# Patient Record
Sex: FEMALE | Race: WHITE | NOT HISPANIC OR LATINO | Employment: OTHER | URBAN - METROPOLITAN AREA
[De-identification: names, ages, dates, MRNs, and addresses within clinical notes are randomized per-mention and may not be internally consistent; named-entity substitution may affect disease eponyms.]

---

## 2017-03-30 ENCOUNTER — HISTORICAL (OUTPATIENT)
Dept: ADMINISTRATIVE | Facility: HOSPITAL | Age: 82
End: 2017-03-30

## 2017-06-14 ENCOUNTER — HISTORICAL (OUTPATIENT)
Dept: LAB | Facility: HOSPITAL | Age: 82
End: 2017-06-14

## 2017-09-27 ENCOUNTER — HISTORICAL (OUTPATIENT)
Dept: LAB | Facility: HOSPITAL | Age: 82
End: 2017-09-27

## 2018-01-03 ENCOUNTER — HISTORICAL (OUTPATIENT)
Dept: RADIOLOGY | Facility: HOSPITAL | Age: 83
End: 2018-01-03

## 2018-04-04 ENCOUNTER — HISTORICAL (OUTPATIENT)
Dept: ADMINISTRATIVE | Facility: HOSPITAL | Age: 83
End: 2018-04-04

## 2018-05-11 ENCOUNTER — HISTORICAL (OUTPATIENT)
Dept: RADIOLOGY | Facility: HOSPITAL | Age: 83
End: 2018-05-11

## 2018-07-03 ENCOUNTER — HISTORICAL (OUTPATIENT)
Dept: LAB | Facility: HOSPITAL | Age: 83
End: 2018-07-03

## 2018-08-24 ENCOUNTER — HISTORICAL (OUTPATIENT)
Dept: CARDIOLOGY | Facility: HOSPITAL | Age: 83
End: 2018-08-24

## 2018-10-03 ENCOUNTER — HISTORICAL (OUTPATIENT)
Dept: LAB | Facility: HOSPITAL | Age: 83
End: 2018-10-03

## 2018-10-18 ENCOUNTER — HISTORICAL (OUTPATIENT)
Dept: RADIOLOGY | Facility: HOSPITAL | Age: 83
End: 2018-10-18

## 2018-10-30 ENCOUNTER — HISTORICAL (OUTPATIENT)
Dept: LAB | Facility: HOSPITAL | Age: 83
End: 2018-10-30

## 2018-11-06 ENCOUNTER — HISTORICAL (OUTPATIENT)
Dept: ADMINISTRATIVE | Facility: HOSPITAL | Age: 83
End: 2018-11-06

## 2018-11-12 ENCOUNTER — HISTORICAL (OUTPATIENT)
Dept: ADMINISTRATIVE | Facility: HOSPITAL | Age: 83
End: 2018-11-12

## 2019-01-02 ENCOUNTER — HISTORICAL (OUTPATIENT)
Dept: RADIOLOGY | Facility: HOSPITAL | Age: 84
End: 2019-01-02

## 2019-01-07 ENCOUNTER — HISTORICAL (OUTPATIENT)
Dept: ENDOSCOPY | Facility: HOSPITAL | Age: 84
End: 2019-01-07

## 2019-01-14 LAB
BUN SERPL-MCNC: 18 MG/DL (ref 7–18)
CALCIUM SERPL-MCNC: 9.7 MG/DL (ref 8.5–10.1)
CHLORIDE SERPL-SCNC: 95 MMOL/L (ref 98–107)
CO2 SERPL-SCNC: 29 MMOL/L (ref 21–32)
CREAT SERPL-MCNC: 1.3 MG/DL (ref 0.6–1.3)
GLUCOSE SERPL-MCNC: 129 MG/DL (ref 74–106)
POTASSIUM SERPL-SCNC: 4.6 MMOL/L (ref 3.5–5.1)
SODIUM SERPL-SCNC: 136 MEQ/L (ref 131–145)

## 2019-03-21 ENCOUNTER — HISTORICAL (OUTPATIENT)
Dept: ADMINISTRATIVE | Facility: HOSPITAL | Age: 84
End: 2019-03-21

## 2019-03-21 LAB
ABS NEUT (OLG): 2.97 X10(3)/MCL (ref 2.1–9.2)
ALBUMIN SERPL-MCNC: 3.2 GM/DL (ref 3.4–5)
ALBUMIN/GLOB SERPL: 0.8 {RATIO}
ALP SERPL-CCNC: 73 UNIT/L (ref 38–126)
ALT SERPL-CCNC: 21 UNIT/L (ref 12–78)
APPEARANCE, UA: NORMAL
AST SERPL-CCNC: 26 UNIT/L (ref 15–37)
BACTERIA SPEC CULT: NORMAL /HPF
BASOPHILS # BLD AUTO: 0 X10(3)/MCL (ref 0–0.2)
BASOPHILS NFR BLD AUTO: 1 %
BILIRUB SERPL-MCNC: 0.4 MG/DL (ref 0.2–1)
BILIRUB UR QL STRIP: NEGATIVE
BILIRUBIN DIRECT+TOT PNL SERPL-MCNC: 0.1 MG/DL (ref 0–0.2)
BILIRUBIN DIRECT+TOT PNL SERPL-MCNC: 0.3 MG/DL (ref 0–0.8)
BUN SERPL-MCNC: 17 MG/DL (ref 7–18)
CALCIUM SERPL-MCNC: 9.4 MG/DL (ref 8.5–10.1)
CHLORIDE SERPL-SCNC: 104 MMOL/L (ref 98–107)
CO2 SERPL-SCNC: 31 MMOL/L (ref 21–32)
COLOR UR: NORMAL
CREAT SERPL-MCNC: 0.79 MG/DL (ref 0.55–1.02)
DEPRECATED CALCIDIOL+CALCIFEROL SERPL-MC: 53.51 NG/ML (ref 30–80)
EOSINOPHIL # BLD AUTO: 0.2 X10(3)/MCL (ref 0–0.9)
EOSINOPHIL NFR BLD AUTO: 4 %
ERYTHROCYTE [DISTWIDTH] IN BLOOD BY AUTOMATED COUNT: 13.1 % (ref 11.5–17)
EST. AVERAGE GLUCOSE BLD GHB EST-MCNC: 123 MG/DL
GLOBULIN SER-MCNC: 4 GM/DL (ref 2.4–3.5)
GLUCOSE (UA): NEGATIVE
GLUCOSE SERPL-MCNC: 87 MG/DL (ref 74–106)
HBA1C MFR BLD: 5.9 % (ref 4.2–6.3)
HCT VFR BLD AUTO: 39 % (ref 37–47)
HGB BLD-MCNC: 12.3 GM/DL (ref 12–16)
HGB UR QL STRIP: NEGATIVE
KETONES UR QL STRIP: NEGATIVE
LEUKOCYTE ESTERASE UR QL STRIP: NEGATIVE
LYMPHOCYTES # BLD AUTO: 1.5 X10(3)/MCL (ref 0.6–4.6)
LYMPHOCYTES NFR BLD AUTO: 29 %
MCH RBC QN AUTO: 31.5 PG (ref 27–31)
MCHC RBC AUTO-ENTMCNC: 31.5 GM/DL (ref 33–36)
MCV RBC AUTO: 100 FL (ref 80–94)
MONOCYTES # BLD AUTO: 0.6 X10(3)/MCL (ref 0.1–1.3)
MONOCYTES NFR BLD AUTO: 11 %
NEUTROPHILS # BLD AUTO: 2.97 X10(3)/MCL (ref 2.1–9.2)
NEUTROPHILS NFR BLD AUTO: 56 %
NITRITE UR QL STRIP: NEGATIVE
PH UR STRIP: 7 [PH] (ref 5–9)
PLATELET # BLD AUTO: 216 X10(3)/MCL (ref 130–400)
PMV BLD AUTO: 9.6 FL (ref 9.4–12.4)
POTASSIUM SERPL-SCNC: 4.7 MMOL/L (ref 3.5–5.1)
PROT SERPL-MCNC: 7.2 GM/DL (ref 6.4–8.2)
PROT UR QL STRIP: NEGATIVE
RBC # BLD AUTO: 3.9 X10(6)/MCL (ref 4.2–5.4)
RBC #/AREA URNS HPF: NORMAL /[HPF]
SODIUM SERPL-SCNC: 140 MMOL/L (ref 136–145)
SP GR UR STRIP: 1.02 (ref 1–1.03)
SQUAMOUS EPITHELIAL, UA: NORMAL
TSH SERPL-ACNC: 3.77 MIU/L (ref 0.36–3.74)
UROBILINOGEN UR STRIP-ACNC: 0.2
WBC # SPEC AUTO: 5.3 X10(3)/MCL (ref 4.5–11.5)
WBC #/AREA URNS HPF: NORMAL /[HPF]

## 2019-09-24 ENCOUNTER — HISTORICAL (OUTPATIENT)
Dept: ADMINISTRATIVE | Facility: HOSPITAL | Age: 84
End: 2019-09-24

## 2020-03-09 ENCOUNTER — HISTORICAL (OUTPATIENT)
Dept: ADMINISTRATIVE | Facility: HOSPITAL | Age: 85
End: 2020-03-09

## 2020-07-22 ENCOUNTER — HISTORICAL (OUTPATIENT)
Dept: RADIOLOGY | Facility: HOSPITAL | Age: 85
End: 2020-07-22

## 2021-07-16 ENCOUNTER — HISTORICAL (OUTPATIENT)
Dept: CARDIOLOGY | Facility: HOSPITAL | Age: 86
End: 2021-07-16

## 2021-09-27 ENCOUNTER — HISTORICAL (OUTPATIENT)
Dept: ADMINISTRATIVE | Facility: HOSPITAL | Age: 86
End: 2021-09-27

## 2022-04-07 ENCOUNTER — HISTORICAL (OUTPATIENT)
Dept: ADMINISTRATIVE | Facility: HOSPITAL | Age: 87
End: 2022-04-07

## 2022-04-23 VITALS
BODY MASS INDEX: 18.82 KG/M2 | DIASTOLIC BLOOD PRESSURE: 77 MMHG | WEIGHT: 119.94 LBS | HEIGHT: 67 IN | OXYGEN SATURATION: 98 % | SYSTOLIC BLOOD PRESSURE: 134 MMHG

## 2022-04-30 NOTE — OP NOTE
Patient:   Mary Pedraza            MRN: [id number]            FIN: [id number]               Age:   85 years     Sex:  Female     :  1935   Associated Diagnoses:   None   Author:   You Joya MD      DATE OF SURGERY:    21    SURGEON:  You Joya MD    PREOPERATIVE DIAGNOSIS:  T2, T3 compression fractures.    POSTOPERATIVE DIAGNOSIS:  T2, T3 compression fractures.    PROCEDURES PERFORMED:    1. T2, T3 kyphoplasty.  2. Bone biopsy at T2 and T3.    EQUIPMENT USED:  CyberDefender kyphoplasty tray.    DESCRIPTION OF PROCEDURE:  Following informed consent, and with the patient under general endotracheal anesthesia, they were prepped and draped in the usual sterile fashion.  The T2 vertebral body was located under fluoroscopy.  An incision was made over the left pedicle.  A trocar was advanced via a left extrapedicular approach to the posterior third of the vertebral body.  Bone biopsy was performed.  A drill was inserted and advanced to the anterior third of the vertebral body.  This was removed.  A balloon was then inserted and inflated to a maximum pressure of 300 psi.  This was deflated and removed.  The T3 vertebral body was located under fluoroscopy.  An incision was made over the left pedicle.  A trocar was advanced via a left extrapedicular approach to the posterior third of the vertebral body.  Bone biopsy was performed.  A drill was inserted and advanced to the anterior third of the vertebral body.  This was removed.  A balloon was then inserted and inflated to a maximum pressure of 200 psi.  This was deflated and removed.  Bone cement was prepared and 1.5 mL were placed at T3 and 1cc at T2.  The cannulae was carefully removed.  Final fluoroscopic AP and lateral views demonstrated good cement filling.  The patient tolerated the procedure well without apparent complication.      ______________________________  You Joya MD

## 2022-04-30 NOTE — H&P
HISTORY OF PRESENT ILLNESS:  83-year-old white female with a personal history of colon polyps, and a history of ulcerative proctitis and lymphocytic colitis, presents now for surveillance colonoscopy.  She has a family history of colon cancer in multiple first-degree relatives.  Her last colonoscopy was in November 2013, where she was found to have small adenomatous polyps.  A 5-year followup was recommended.  The patient was seen in October of last year for evaluation of right upper quadrant abdominal pain.  This led to a CT scan of her abdomen that revealed a 2.5 cm mass in the upper pole of her right kidney, suspicious for renal cell carcinoma.  She is to follow up with Urology and apparently surgery will be scheduled at her next followup visit with Dr. Yeboah.  She presents now for colonoscopy.  She denies any melena, hematochezia or change in bowel functions.    ALLERGIES:  Oxycodone.    MEDICATIONS:    1. Amitriptyline.   2. Asacol.   3. Baby aspirin.   4. B complex.  5. Biotin.    6. Caltrate.  7. Ocuvite.   8. Vitamin D3.    PAST MEDICAL HISTORY:  History of breast cancer, history of colon polyps, hypertension, osteopenia.    PAST SURGICAL HISTORY:  Appendectomy, vaginal surgery, cataract surgery, bilateral salpingo-oophorectomy, laparoscopic cholecystectomy, right breast mastectomy.    SOCIAL HISTORY:  She is .  She is retired.  No smoking.  No alcohol.    FAMILY HISTORY:  She had a brother and father die from colon cancer.    PHYSICAL EXAMINATION:  VITAL SIGNS:  Stable.  Afebrile.   GENERAL:  She is an older female in no acute distress.   HEENT:  Sclerae nonicteric.  Conjunctivae pink.     NECK:  No adenopathy or thyromegaly.   CARDIOVASCULAR:  Regular rate and rhythm.   LUNGS:  Clear.   ABDOMEN:  Soft, nontender.  Bowel sounds normal.  No mass or organomegaly appreciated.   EXTREMITIES:  No clubbing, cyanosis or edema.     NEUROLOGIC:  Alert and oriented.  No focal deficits.    IMPRESSION:   Personal history of colon polyps, and family history of colon cancer.    RECOMMENDATIONS:  We will proceed with surveillance colonoscopy.        ______________________________  Jr Espino MD    [facility]  DD:  01/07/2019  Time:  09:29AM  DT:  01/07/2019  Time:  10:40AM  Job #:  [id number]    The H&P was reviewed, the patient was examined, and the following changes to the patients condition are noted:  ______________________________________________________________________________  ______________________________________________________________________________  ______________________________________________________________________________  [  ] No changes to the patient's condition:      ______________________________                                             ___________________  PHYSICIAN SIGNATURE                                                             DATE/TIME    cc: MD Sloan Gordon MD

## 2022-09-15 ENCOUNTER — HISTORICAL (OUTPATIENT)
Dept: ADMINISTRATIVE | Facility: HOSPITAL | Age: 87
End: 2022-09-15

## 2022-09-19 ENCOUNTER — TELEPHONE (OUTPATIENT)
Dept: INTERNAL MEDICINE | Facility: CLINIC | Age: 87
End: 2022-09-19
Payer: MEDICARE

## 2022-09-19 DIAGNOSIS — Z00.00 WELLNESS EXAMINATION: Primary | ICD-10-CM

## 2022-09-19 NOTE — TELEPHONE ENCOUNTER
----- Message from Zenobia Lynch sent at 9/19/2022  9:54 AM CDT -----  Pt's appt 9.29.22, Lab orders need to be put in please, pt's appt for Labs are schd for 9.23.22

## 2022-09-22 ENCOUNTER — TELEPHONE (OUTPATIENT)
Dept: ADMINISTRATIVE | Facility: HOSPITAL | Age: 87
End: 2022-09-22
Payer: MEDICARE

## 2022-09-22 NOTE — TELEPHONE ENCOUNTER
----- Message from Juan Vargas MA sent at 9/22/2022 10:52 AM CDT -----  Regarding: PV 9/29/22 @ 10:40 Dr. Macias  1. Are there any outstanding tasks in the patient's chart? Yes, fasting labs    2. Is there any documentation in the chart? No    3.Has patient been seen in an ER, Urgent care clinic, or been admitted since last visit?  If yes, When, where, and why    4. Has patient seen any other healthcare providers since last visit?  If yes, when, where, and why    5. Has patient had any bloodwork or XR done since last visit?    6. Is patient signed up for patient portal?

## 2022-09-23 ENCOUNTER — LAB VISIT (OUTPATIENT)
Dept: LAB | Facility: HOSPITAL | Age: 87
End: 2022-09-23
Attending: INTERNAL MEDICINE
Payer: MEDICARE

## 2022-09-23 DIAGNOSIS — Z00.00 WELLNESS EXAMINATION: ICD-10-CM

## 2022-09-23 LAB
ALBUMIN SERPL-MCNC: 3.9 GM/DL (ref 3.4–4.8)
ALBUMIN/GLOB SERPL: 1.1 RATIO (ref 1.1–2)
ALP SERPL-CCNC: 70 UNIT/L (ref 40–150)
ALT SERPL-CCNC: 10 UNIT/L (ref 0–55)
APPEARANCE UR: ABNORMAL
AST SERPL-CCNC: 20 UNIT/L (ref 5–34)
BACTERIA #/AREA URNS AUTO: NORMAL /HPF
BASOPHILS # BLD AUTO: 0.04 X10(3)/MCL (ref 0–0.2)
BASOPHILS NFR BLD AUTO: 0.8 %
BILIRUB UR QL STRIP.AUTO: NEGATIVE MG/DL
BILIRUBIN DIRECT+TOT PNL SERPL-MCNC: 0.7 MG/DL
BUN SERPL-MCNC: 20.4 MG/DL (ref 9.8–20.1)
CALCIUM SERPL-MCNC: 10 MG/DL (ref 8.4–10.2)
CHLORIDE SERPL-SCNC: 100 MMOL/L (ref 98–107)
CHOLEST SERPL-MCNC: 228 MG/DL
CHOLEST/HDLC SERPL: 3 {RATIO} (ref 0–5)
CO2 SERPL-SCNC: 29 MMOL/L (ref 23–31)
COLOR UR AUTO: YELLOW
CREAT SERPL-MCNC: 0.84 MG/DL (ref 0.55–1.02)
DEPRECATED CALCIDIOL+CALCIFEROL SERPL-MC: 48.2 NG/ML (ref 30–80)
EOSINOPHIL # BLD AUTO: 0.16 X10(3)/MCL (ref 0–0.9)
EOSINOPHIL NFR BLD AUTO: 3.1 %
ERYTHROCYTE [DISTWIDTH] IN BLOOD BY AUTOMATED COUNT: 12.8 % (ref 11.5–17)
EST. AVERAGE GLUCOSE BLD GHB EST-MCNC: 114 MG/DL
GFR SERPLBLD CREATININE-BSD FMLA CKD-EPI: >60 MLS/MIN/1.73/M2
GLOBULIN SER-MCNC: 3.5 GM/DL (ref 2.4–3.5)
GLUCOSE SERPL-MCNC: 111 MG/DL (ref 82–115)
GLUCOSE UR QL STRIP.AUTO: NEGATIVE MG/DL
HBA1C MFR BLD: 5.6 %
HCT VFR BLD AUTO: 43.5 % (ref 37–47)
HDLC SERPL-MCNC: 77 MG/DL (ref 35–60)
HGB BLD-MCNC: 14.1 GM/DL (ref 12–16)
IMM GRANULOCYTES # BLD AUTO: 0.01 X10(3)/MCL (ref 0–0.04)
IMM GRANULOCYTES NFR BLD AUTO: 0.2 %
KETONES UR QL STRIP.AUTO: NEGATIVE MG/DL
LDLC SERPL CALC-MCNC: 136 MG/DL (ref 50–140)
LEUKOCYTE ESTERASE UR QL STRIP.AUTO: ABNORMAL UNIT/L
LYMPHOCYTES # BLD AUTO: 1.53 X10(3)/MCL (ref 0.6–4.6)
LYMPHOCYTES NFR BLD AUTO: 30.1 %
MCH RBC QN AUTO: 32 PG (ref 27–31)
MCHC RBC AUTO-ENTMCNC: 32.4 MG/DL (ref 33–36)
MCV RBC AUTO: 98.9 FL (ref 80–94)
MONOCYTES # BLD AUTO: 0.47 X10(3)/MCL (ref 0.1–1.3)
MONOCYTES NFR BLD AUTO: 9.3 %
NEUTROPHILS # BLD AUTO: 2.9 X10(3)/MCL (ref 2.1–9.2)
NEUTROPHILS NFR BLD AUTO: 56.5 %
NITRITE UR QL STRIP.AUTO: NEGATIVE
NRBC BLD AUTO-RTO: 0 %
PH UR STRIP.AUTO: 8 [PH]
PLATELET # BLD AUTO: 186 X10(3)/MCL (ref 130–400)
PMV BLD AUTO: 9.7 FL (ref 7.4–10.4)
POTASSIUM SERPL-SCNC: 4.4 MMOL/L (ref 3.5–5.1)
PROT SERPL-MCNC: 7.4 GM/DL (ref 5.8–7.6)
PROT UR QL STRIP.AUTO: ABNORMAL MG/DL
RBC # BLD AUTO: 4.4 X10(6)/MCL (ref 4.2–5.4)
RBC #/AREA URNS AUTO: <5 /HPF
RBC UR QL AUTO: NEGATIVE UNIT/L
SODIUM SERPL-SCNC: 136 MMOL/L (ref 136–145)
SP GR UR STRIP.AUTO: 1.01 (ref 1–1.03)
SQUAMOUS #/AREA URNS AUTO: <5 /HPF
TRIGL SERPL-MCNC: 75 MG/DL (ref 37–140)
TSH SERPL-ACNC: 2.53 UIU/ML (ref 0.35–4.94)
UROBILINOGEN UR STRIP-ACNC: 1 MG/DL
VLDLC SERPL CALC-MCNC: 15 MG/DL
WBC # SPEC AUTO: 5.1 X10(3)/MCL (ref 4.5–11.5)
WBC #/AREA URNS AUTO: <5 /HPF

## 2022-09-23 PROCEDURE — 84443 ASSAY THYROID STIM HORMONE: CPT

## 2022-09-23 PROCEDURE — 80053 COMPREHEN METABOLIC PANEL: CPT

## 2022-09-23 PROCEDURE — 82306 VITAMIN D 25 HYDROXY: CPT

## 2022-09-23 PROCEDURE — 85025 COMPLETE CBC W/AUTO DIFF WBC: CPT

## 2022-09-23 PROCEDURE — 83036 HEMOGLOBIN GLYCOSYLATED A1C: CPT

## 2022-09-23 PROCEDURE — 81001 URINALYSIS AUTO W/SCOPE: CPT

## 2022-09-23 PROCEDURE — 80061 LIPID PANEL: CPT

## 2022-09-23 PROCEDURE — 36415 COLL VENOUS BLD VENIPUNCTURE: CPT

## 2022-09-29 ENCOUNTER — OFFICE VISIT (OUTPATIENT)
Dept: INTERNAL MEDICINE | Facility: CLINIC | Age: 87
End: 2022-09-29
Payer: MEDICARE

## 2022-09-29 VITALS
HEIGHT: 67 IN | BODY MASS INDEX: 18.1 KG/M2 | OXYGEN SATURATION: 99 % | SYSTOLIC BLOOD PRESSURE: 138 MMHG | TEMPERATURE: 97 F | WEIGHT: 115.31 LBS | DIASTOLIC BLOOD PRESSURE: 80 MMHG | RESPIRATION RATE: 16 BRPM | HEART RATE: 69 BPM

## 2022-09-29 DIAGNOSIS — K59.00 CONSTIPATION, UNSPECIFIED CONSTIPATION TYPE: ICD-10-CM

## 2022-09-29 DIAGNOSIS — Z00.00 MEDICARE ANNUAL WELLNESS VISIT, SUBSEQUENT: ICD-10-CM

## 2022-09-29 DIAGNOSIS — I10 HYPERTENSION, UNSPECIFIED TYPE: Primary | ICD-10-CM

## 2022-09-29 DIAGNOSIS — E55.9 VITAMIN D DEFICIENCY: ICD-10-CM

## 2022-09-29 DIAGNOSIS — C64.9 MALIGNANT NEOPLASM OF KIDNEY, UNSPECIFIED LATERALITY: ICD-10-CM

## 2022-09-29 DIAGNOSIS — Z23 FLU VACCINE NEED: ICD-10-CM

## 2022-09-29 DIAGNOSIS — I35.0 AORTIC VALVE STENOSIS, ETIOLOGY OF CARDIAC VALVE DISEASE UNSPECIFIED: ICD-10-CM

## 2022-09-29 PROBLEM — C50.919 MALIGNANT NEOPLASM OF BREAST: Status: ACTIVE | Noted: 2022-09-29

## 2022-09-29 PROBLEM — K52.9 COLITIS: Status: ACTIVE | Noted: 2022-09-29

## 2022-09-29 PROBLEM — N28.89 RENAL MASS: Status: ACTIVE | Noted: 2022-09-29

## 2022-09-29 PROCEDURE — 90694 FLU VACCINE - QUADRIVALENT - ADJUVANTED: ICD-10-PCS | Mod: ,,, | Performed by: INTERNAL MEDICINE

## 2022-09-29 PROCEDURE — G0439 PPPS, SUBSEQ VISIT: HCPCS | Mod: ,,, | Performed by: INTERNAL MEDICINE

## 2022-09-29 PROCEDURE — G0008 FLU VACCINE - QUADRIVALENT - ADJUVANTED: ICD-10-PCS | Mod: ,,, | Performed by: INTERNAL MEDICINE

## 2022-09-29 PROCEDURE — 90694 VACC AIIV4 NO PRSRV 0.5ML IM: CPT | Mod: ,,, | Performed by: INTERNAL MEDICINE

## 2022-09-29 PROCEDURE — G0439 PR MEDICARE ANNUAL WELLNESS SUBSEQUENT VISIT: ICD-10-PCS | Mod: ,,, | Performed by: INTERNAL MEDICINE

## 2022-09-29 PROCEDURE — G0008 ADMIN INFLUENZA VIRUS VAC: HCPCS | Mod: ,,, | Performed by: INTERNAL MEDICINE

## 2022-09-29 RX ORDER — LUBIPROSTONE 8 UG/1
8 CAPSULE ORAL 2 TIMES DAILY PRN
Qty: 60 CAPSULE | Refills: 0 | Status: SHIPPED | OUTPATIENT
Start: 2022-09-29 | End: 2023-03-02 | Stop reason: SDUPTHER

## 2022-09-29 RX ORDER — AMITRIPTYLINE HYDROCHLORIDE 10 MG/1
10 TABLET, FILM COATED ORAL NIGHTLY
COMMUNITY
Start: 2022-09-22 | End: 2023-01-11 | Stop reason: SDUPTHER

## 2022-09-29 RX ORDER — METOPROLOL SUCCINATE 25 MG/1
25 TABLET, EXTENDED RELEASE ORAL DAILY
COMMUNITY
Start: 2022-09-19

## 2022-09-29 RX ORDER — PANTOPRAZOLE SODIUM 40 MG/1
40 TABLET, DELAYED RELEASE ORAL EVERY MORNING
COMMUNITY
Start: 2022-09-19

## 2022-09-29 NOTE — PROGRESS NOTES
Patient ID: [id number]     Chief Complaint: Medicare AWV      HPI:     Mary Pedraza is a 87 y.o. female here today for a Medicare Wellness.   86-year-old  female here for 6 month revisit  Past medical history of breast cancer in 1999. Right mastectomy.  Last left mammogram in January 2019.  Patient no longer wants to do mammograms.  Had 9 siblings at one time; now only 2 are living  Dr. Hernandez-cardiology.  Abnormal PET in July 2018, coronary angiogram done at that time which showed clean coronaries.  Aortic stenosis; moderate/ severe. Has some Dyspnea, no syncope, has some pre- syncope  Dr. 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  Last colonoscopy in January 2019. Off mesalamine  Dr. Yeboah-urology history of renal cell carcinoma status post partial nephrectomy. Goes to see him once a year with scans.   Patient with history of osteopenia, no longer wants bone densities  Had kyphoplasty recently  Trulance PRN for constipation  Flu shot  Bivalent COVID  Has lost 4 lbs since last visit; she use to weigh 119  She reports a good appetite  She reports being under stress  Discussed depression and meds for depression and she declines    Opioid Screening: Patient medication list reviewed, patient is not taking prescription opioids. Patient is not using additional opioids than prescribed. Patient is not at low risk of substance abuse based on this opioid use history.       ----------------------------  Anemia, unspecified  Anxiety disorder, unspecified  Bilateral cataracts  Breast cancer  Bruises easily  Gastro-esophageal reflux disease without esophagitis  Generalized arthritis  HTN (hypertension)  Kidney mass  Other specified noninfective gastroenteritis and colitis  Panic attacks  Personal history of colonic polyps  Renal cancer     History reviewed. No pertinent surgical history.    Review of patient's allergies indicates:   Allergen Reactions    Adhesive tape-silicones Hives     Neomycin-bacitracin-polymyxin      Other reaction(s): makes area worse    Oxycodone      Other reaction(s): hoarseness  restless, rash    Dexamethasone Rash     pain in jaw, shoulder, ears, and rash       Outpatient Medications Marked as Taking for the 9/29/22 encounter (Office Visit) with Sloan Rai MD   Medication Sig Dispense Refill    amitriptyline (ELAVIL) 10 MG tablet Take 10 mg by mouth every evening.      metoprolol succinate (TOPROL-XL) 25 MG 24 hr tablet Take 25 mg by mouth once daily.      pantoprazole (PROTONIX) 40 MG tablet Take 40 mg by mouth every morning.         Social History     Socioeconomic History    Marital status:    Tobacco Use    Smoking status: Never    Smokeless tobacco: Never   Substance and Sexual Activity    Alcohol use: Not Currently    Drug use: Never    Sexual activity: Not Currently        History reviewed. No pertinent family history.     Patient Care Team:  Sloan Rai MD as PCP - General (Internal Medicine)  Sloan Rai MD       Subjective:     ROS  Constitutional: No fever, no chills, no night sweats, + changes in weight, no changes in appetite.  Eye: No blurring of vision, no double vision, no conjunctival injection, no acute vision loss  ENMT: No trauma, No sore throat, no rhinorrhea, no tinnitus, no headache, no vertigo, no ear pain or discharge  Respiratory: No cough, no sputum production, no shortness of breath, no hemoptysis, no wheezing.  Cardiovascular: No chest pain, no DILLON, no PND, no orthopnea, no edema, no palpitations.  Gastrointestinal: No abdominal pain, nausea, no vomiting, no changes in frequency or consistency of stools, no diarrhea, no constipation, no BRBPR.  Genitourinary: No dysuria, no hematuria, no foul-smelling urine, no straining to urinate, no increase in frequency  Heme/Lymph: No easy bruising/ bleeding, no swollen or painful glands.  Endocrine: No polyuria, no polydipsia, no polyphagia, no heat or cold  "intolerance.  Musculoskeletal: No muscle pain, no muscle weakness, no joint pain, no difficulties on reference to range of motion.  Integumentary: No rash, no pruritis.  Neurologic: No sensory deficit, no motor deficit, no headache, no neck rigidity, no paresthesias, no syncope.  Psychiatric: no mood changes, no anxiety, no depression, no tension, no memory defecits  All Other ROS: Negative with exception of what is documented in the history of present illness     Patient Reported Health Risk Assessment       Objective:     /80 (BP Location: Left arm, Patient Position: Sitting, BP Method: Medium (Manual))   Pulse 69   Temp 97.2 °F (36.2 °C) (Temporal)   Resp 16   Ht 5' 7" (1.702 m)   Wt 52.3 kg (115 lb 4.8 oz)   SpO2 99%   BMI 18.06 kg/m²     Physical Exam  General : Alert and oriented, No acute distress, afebrile.  Eye : PERRLA. EOMI. Normal conjunctiva, Sclerae are nonicteric. No conjunctival injection, no pallor.  HEENT : Normocephalic/ atraumatic, Normal hearing, Oral mucosa is moist.  Respiratory : Respirations are non-labored and clear to auscultation bilaterally. Symmetrical air entry bilaterally, no crackles, no wheezes, no rhonchi. No cyanosis, no clubbing.  Cardiovascular : Normal rate, Regular rhythm. No murmurs, rubs, or gallops. Pulses are 2+ throughout. No JVD. No Edema.  Gastrointestinal : Soft, nontender, non-distended, bowel sounds are present in all quadrants, no organomegaly, no guarding, no rebound.  Musculoskeletal : Normal range of motion throughout. No muscle tenderness.  Integumentary : Warm, moist, intact.  Neurologic : Alert, Oriented  Psychiatric : Cooperative, Appropriate mood & affect.     No flowsheet data found.  Fall Risk Assessment - Outpatient 9/29/2022   Mobility Status Ambulatory   Number of falls 0   Identified as fall risk 0              Assessment/Plan:     1. Hypertension, unspecified type  Overview:  no cardiologist, managed by Dr Smith    Orders:  -     " Urinalysis, Reflex to Urine Culture Urine, Clean Catch  -     Lipid Panel  -     Comprehensive Metabolic Panel  -     CBC Auto Differential  -     Vitamin D    2. Aortic valve stenosis, etiology of cardiac valve disease unspecified  Assessment & Plan:  Being monitored by Cardiology, patient asymptomatic.    Orders:  -     Urinalysis, Reflex to Urine Culture Urine, Clean Catch  -     Lipid Panel  -     Comprehensive Metabolic Panel  -     CBC Auto Differential  -     Vitamin D    3. Malignant neoplasm of kidney, unspecified laterality    4. Medicare annual wellness visit, subsequent  Assessment & Plan:  General health maintenance education given, labs reviewed excellent.  Flu vaccine today.  I advised on new COVID vaccine.  Age-appropriate exams are otherwise up-to-date.  RTC 6 months for revisit, sooner if needed      5. Constipation, unspecified constipation type    6. Vitamin D deficiency  -     Vitamin D    Other orders  -     lubiprostone (AMITIZA) 8 MCG Cap         Medicare Annual Wellness and Personalized Prevention Plan:   Fall Risk + Home Safety + Hearing Impairment + Depression Screen + Opioid and Substance Abuse Screening + Cognitive Impairment Screen + Health Risk Assessment all reviewed.     Health Maintenance Topics with due status: Not Due       Topic Last Completion Date    Lipid Panel 09/23/2022      The patient's Health Maintenance was reviewed and the following appears to be due at this time:   Health Maintenance Due   Topic Date Due    TETANUS VACCINE  Never done    Shingles Vaccine (1 of 2) Never done    COVID-19 Vaccine (5 - Booster for Moderna series) 08/30/2022    Influenza Vaccine (1) 09/01/2022       Advance Care Planning   I attest to discussing Advance Care Planning with patient and/or family member.  Education was provided including the importance of the Health Care Power of , Advance Directives, and/or [place] documentation.  The patient expressed understanding to the importance  of this information and discussion.         Follow up in about 6 months (around 3/29/2023) for with labs prior to visit, NURSE PRACTITIONER. In addition to their scheduled follow up, the patient has also been instructed to follow up on as needed basis.

## 2022-09-29 NOTE — ASSESSMENT & PLAN NOTE
General health maintenance education given, labs reviewed excellent.  Flu vaccine today.  I advised on new COVID vaccine.  Age-appropriate exams are otherwise up-to-date.  RTC 6 months for revisit, sooner if needed

## 2022-09-29 NOTE — PROGRESS NOTES
Subjective:      Patient ID: Mary Pedraza is a 87 y.o. female.    Chief Complaint: No chief complaint on file.      HPI:  86-year-old  female here for 6 month revisit  Past medical history of breast cancer in 1999. Right mastectomy.  Last left mammogram in January 2019.  Patient no longer wants to do mammograms.  Had 9 siblings at one time; now only 2 are living  Dr. Hernandez-cardiology.  Abnormal PET in July 2018, coronary angiogram done at that time which showed clean coronaries.  Aortic stenosis; moderate/ severe. Has some Dyspnea, no syncope, has some pre- syncope  Dr. 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  Last colonoscopy in January 2019. Off mesalamine  Dr. Yeboah-urology history of renal cell carcinoma status post partial nephrectomy. Sees [place] this month  Patient with history of osteopenia, no longer wants bone densities  Had kyphoplasty recently  She presents today with complaints of constipation she is doing Colace which is not really helpful  She has tried MiraLAX just once a day with no improvement; stools are hard    Past Medical History:  No past medical history on file.  No past surgical history on file.  Review of patient's allergies indicates:  Not on File  No current outpatient medications on file prior to visit.     No current facility-administered medications on file prior to visit.     Social History     Socioeconomic History    Marital status:      No family history on file.    Review of Systems  A comprehensive review of systems was performed and was negative with exception of what is documented above.     Objective:   There were no vitals taken for this visit.  Physical Exam    Assessment/ Plan:   {There are no diagnoses linked to this encounter. (Refresh or delete this SmartLink)}         No follow-ups on file.

## 2023-01-02 PROBLEM — Z00.00 MEDICARE ANNUAL WELLNESS VISIT, SUBSEQUENT: Status: RESOLVED | Noted: 2022-09-29 | Resolved: 2023-01-02

## 2023-01-11 ENCOUNTER — TELEPHONE (OUTPATIENT)
Dept: INTERNAL MEDICINE | Facility: CLINIC | Age: 88
End: 2023-01-11
Payer: MEDICARE

## 2023-01-11 DIAGNOSIS — C64.9 MALIGNANT NEOPLASM OF KIDNEY, UNSPECIFIED LATERALITY: Primary | ICD-10-CM

## 2023-01-11 RX ORDER — AMITRIPTYLINE HYDROCHLORIDE 10 MG/1
10 TABLET, FILM COATED ORAL NIGHTLY
Qty: 90 TABLET | Refills: 3 | Status: SHIPPED | OUTPATIENT
Start: 2023-01-11 | End: 2023-02-28

## 2023-01-11 NOTE — TELEPHONE ENCOUNTER
----- Message from Jackelyn Madison sent at 1/11/2023 12:51 PM CST -----  Amitriptyline 10 MG Tab  [facility]

## 2023-03-02 ENCOUNTER — TELEPHONE (OUTPATIENT)
Dept: INTERNAL MEDICINE | Facility: CLINIC | Age: 88
End: 2023-03-02
Payer: MEDICARE

## 2023-03-02 DIAGNOSIS — K59.00 CONSTIPATION, UNSPECIFIED CONSTIPATION TYPE: Primary | ICD-10-CM

## 2023-03-02 RX ORDER — LUBIPROSTONE 8 UG/1
8 CAPSULE ORAL 2 TIMES DAILY PRN
Qty: 180 CAPSULE | Refills: 1 | Status: SHIPPED | OUTPATIENT
Start: 2023-03-02 | End: 2024-01-22

## 2023-03-02 NOTE — TELEPHONE ENCOUNTER
----- Message from Blanca Montemayor sent at 3/2/2023 12:12 PM CST -----  .Type:  Needs Medical Advice    Who Called: pt  Symptoms (please be specific): stool is stuck   How long has patient had these symptoms:  today  Pharmacy name and phone #:  [id number] - [place] - [address] [facility] [address]  Would the patient rather a call back or a response via [facility]? Call back   Best Call Back Number: [phone]  Additional Information: pt states she has a fecal impaction  , she had a small BM this morning, please call

## 2023-03-02 NOTE — TELEPHONE ENCOUNTER
----- Message from Jackelyn Madison sent at 3/2/2023  3:51 PM CST -----  [facility] sent fax requesting for the Lubiprostone Cap.

## 2023-03-02 NOTE — TELEPHONE ENCOUNTER
Spoke with patient son . Pt is currently taking Trulance for constipation and its working. I advised pt son to call the office back if she start have any other issues.

## 2023-09-20 ENCOUNTER — TELEPHONE (OUTPATIENT)
Dept: INTERNAL MEDICINE | Facility: CLINIC | Age: 88
End: 2023-09-20
Payer: MEDICARE

## 2023-09-25 ENCOUNTER — LAB VISIT (OUTPATIENT)
Dept: LAB | Facility: HOSPITAL | Age: 88
End: 2023-09-25
Attending: INTERNAL MEDICINE
Payer: MEDICARE

## 2023-09-25 DIAGNOSIS — E55.9 VITAMIN D DEFICIENCY: ICD-10-CM

## 2023-09-25 DIAGNOSIS — I10 HYPERTENSION, UNSPECIFIED TYPE: ICD-10-CM

## 2023-09-25 DIAGNOSIS — I35.0 AORTIC VALVE STENOSIS, ETIOLOGY OF CARDIAC VALVE DISEASE UNSPECIFIED: ICD-10-CM

## 2023-09-25 LAB
ALBUMIN SERPL-MCNC: 3.7 G/DL (ref 3.4–4.8)
ALBUMIN/GLOB SERPL: 1.1 RATIO (ref 1.1–2)
ALP SERPL-CCNC: 62 UNIT/L (ref 40–150)
ALT SERPL-CCNC: 9 UNIT/L (ref 0–55)
APPEARANCE UR: CLEAR
AST SERPL-CCNC: 22 UNIT/L (ref 5–34)
BACTERIA #/AREA URNS AUTO: NORMAL /HPF
BASOPHILS # BLD AUTO: 0.04 X10(3)/MCL
BASOPHILS NFR BLD AUTO: 0.7 %
BILIRUB SERPL-MCNC: 0.8 MG/DL
BILIRUB UR QL STRIP.AUTO: NEGATIVE
BUN SERPL-MCNC: 18.9 MG/DL (ref 9.8–20.1)
CALCIUM SERPL-MCNC: 9.5 MG/DL (ref 8.4–10.2)
CHLORIDE SERPL-SCNC: 101 MMOL/L (ref 98–107)
CHOLEST SERPL-MCNC: 219 MG/DL
CHOLEST/HDLC SERPL: 3 {RATIO} (ref 0–5)
CO2 SERPL-SCNC: 25 MMOL/L (ref 23–31)
COLOR UR AUTO: YELLOW
CREAT SERPL-MCNC: 0.87 MG/DL (ref 0.55–1.02)
DEPRECATED CALCIDIOL+CALCIFEROL SERPL-MC: 51.3 NG/ML (ref 30–80)
EOSINOPHIL # BLD AUTO: 0.18 X10(3)/MCL (ref 0–0.9)
EOSINOPHIL NFR BLD AUTO: 3.1 %
ERYTHROCYTE [DISTWIDTH] IN BLOOD BY AUTOMATED COUNT: 13.1 % (ref 11.5–17)
GFR SERPLBLD CREATININE-BSD FMLA CKD-EPI: >60 MLS/MIN/1.73/M2
GLOBULIN SER-MCNC: 3.3 GM/DL (ref 2.4–3.5)
GLUCOSE SERPL-MCNC: 132 MG/DL (ref 82–115)
GLUCOSE UR QL STRIP.AUTO: NEGATIVE
HCT VFR BLD AUTO: 39.9 % (ref 37–47)
HDLC SERPL-MCNC: 73 MG/DL (ref 35–60)
HGB BLD-MCNC: 12.8 G/DL (ref 12–16)
IMM GRANULOCYTES # BLD AUTO: 0.02 X10(3)/MCL (ref 0–0.04)
IMM GRANULOCYTES NFR BLD AUTO: 0.3 %
KETONES UR QL STRIP.AUTO: NEGATIVE
LDLC SERPL CALC-MCNC: 132 MG/DL (ref 50–140)
LEUKOCYTE ESTERASE UR QL STRIP.AUTO: ABNORMAL
LYMPHOCYTES # BLD AUTO: 1.38 X10(3)/MCL (ref 0.6–4.6)
LYMPHOCYTES NFR BLD AUTO: 23.4 %
MCH RBC QN AUTO: 31.4 PG (ref 27–31)
MCHC RBC AUTO-ENTMCNC: 32.1 G/DL (ref 33–36)
MCV RBC AUTO: 97.8 FL (ref 80–94)
MONOCYTES # BLD AUTO: 0.42 X10(3)/MCL (ref 0.1–1.3)
MONOCYTES NFR BLD AUTO: 7.1 %
NEUTROPHILS # BLD AUTO: 3.85 X10(3)/MCL (ref 2.1–9.2)
NEUTROPHILS NFR BLD AUTO: 65.4 %
NITRITE UR QL STRIP.AUTO: NEGATIVE
NRBC BLD AUTO-RTO: 0 %
PH UR STRIP.AUTO: 7.5 [PH]
PLATELET # BLD AUTO: 176 X10(3)/MCL (ref 130–400)
PMV BLD AUTO: 9.6 FL (ref 7.4–10.4)
POTASSIUM SERPL-SCNC: 4.4 MMOL/L (ref 3.5–5.1)
PROT SERPL-MCNC: 7 GM/DL (ref 5.8–7.6)
PROT UR QL STRIP.AUTO: NEGATIVE
RBC # BLD AUTO: 4.08 X10(6)/MCL (ref 4.2–5.4)
RBC #/AREA URNS AUTO: NORMAL /HPF
RBC UR QL AUTO: NEGATIVE
SODIUM SERPL-SCNC: 135 MMOL/L (ref 136–145)
SP GR UR STRIP.AUTO: 1.01 (ref 1–1.03)
SQUAMOUS #/AREA URNS AUTO: <5 /HPF
TRIGL SERPL-MCNC: 71 MG/DL (ref 37–140)
UROBILINOGEN UR STRIP-ACNC: 0.2
VLDLC SERPL CALC-MCNC: 14 MG/DL
WBC # SPEC AUTO: 5.89 X10(3)/MCL (ref 4.5–11.5)
WBC #/AREA URNS AUTO: <5 /HPF

## 2023-09-25 PROCEDURE — 82306 VITAMIN D 25 HYDROXY: CPT

## 2023-09-25 PROCEDURE — 80061 LIPID PANEL: CPT

## 2023-09-25 PROCEDURE — 81001 URINALYSIS AUTO W/SCOPE: CPT

## 2023-09-25 PROCEDURE — 80053 COMPREHEN METABOLIC PANEL: CPT

## 2023-09-25 PROCEDURE — 36415 COLL VENOUS BLD VENIPUNCTURE: CPT

## 2023-09-25 PROCEDURE — 85025 COMPLETE CBC W/AUTO DIFF WBC: CPT

## 2023-10-17 NOTE — PROGRESS NOTES
Internal Medicine      Patient ID: [id number]     Chief Complaint: Medicare Annual Wellness     HPI:     Mary Pedraza is a 88 y.o. female here today for a Medicare Annual Wellness visit and comprehensive Health Risk Assessment. Reviewed and discussed lab results. Fasting glucose elevated but declines further evaluation today. 10# weight loss in the past couple of years.     A separate E/M code has been provided to evaluate additional complaints that the patient would like addressed during the dedicated Medicare Wellness Exam.    Health Maintenance   Topic Date Due    TETANUS VACCINE  Never done    Shingles Vaccine (1 of 2) Never done    Lipid Panel  09/25/2028        Past Medical History:   Diagnosis Date    Anemia, unspecified     Anxiety disorder, unspecified     Bilateral cataracts     Bruises easily     Gastro-esophageal reflux disease without esophagitis     Generalized arthritis     History of breast cancer 10/18/2023    History of renal cell cancer 10/18/2023    HTN (hypertension)     Impaired fasting blood sugar 10/18/2023    Other specified noninfective gastroenteritis and colitis     Panic attacks     Personal history of colonic polyps         History reviewed. No pertinent surgical history.     Social History     Socioeconomic History    Marital status:    Tobacco Use    Smoking status: Never    Smokeless tobacco: Never   Substance and Sexual Activity    Alcohol use: Not Currently    Drug use: Never    Sexual activity: Not Currently     Social Determinants of Health     Financial Resource Strain: Low Risk  (10/18/2023)    Overall Financial Resource Strain (CARDIA)     Difficulty of Paying Living Expenses: Not hard at all   Food Insecurity: No Food Insecurity (10/18/2023)    Hunger Vital Sign     Worried About Running Out of Food in the Last Year: Never true     Ran Out of Food in the Last Year: Never true   Transportation Needs: No Transportation Needs (10/18/2023)    PRAPARE -  Transportation     Lack of Transportation (Medical): No     Lack of Transportation (Non-Medical): No   Physical Activity: Insufficiently Active (10/18/2023)    Exercise Vital Sign     Days of Exercise per Week: 3 days     Minutes of Exercise per Session: 20 min   Stress: No Stress Concern Present (10/18/2023)    [employer] - Occupational Stress Questionnaire     Feeling of Stress : Only a little   Social Connections: Moderately Integrated (10/18/2023)    Social Connection and Isolation Panel [NHANES]     Frequency of Communication with Friends and Family: Three times a week     Frequency of Social Gatherings with Friends and Family: Three times a week     Attends Denominational Services: More than 4 times per year     Active Member of Clubs or Organizations: Yes     Attends Club or Organization Meetings: 1 to 4 times per year     Marital Status:    Housing Stability: Unknown (10/18/2023)    Housing Stability Vital Sign     Unable to Pay for Housing in the Last Year: No     Unstable Housing in the Last Year: No        History reviewed. No pertinent family history.     Current Outpatient Medications   Medication Instructions    amitriptyline (ELAVIL) 10 MG tablet TAKE 1 TABLET BY MOUTH AT BEDTIME    aspirin (ECOTRIN) 81 mg, Oral, Daily    biotin 5,000 mg, Oral, Daily    calcium carbonate-vitamin D3 1,000 mg-20 mcg (800 unit) Tab 800 mg, Oral, Once    docusate sodium (COLACE) 250 mg, Oral, Daily    lubiprostone (AMITIZA) 8 mcg, Oral, 2 times daily PRN    metoprolol succinate (TOPROL-XL) 25 mg, Oral, Daily    pantoprazole (PROTONIX) 40 mg, Oral, Every morning       Review of patient's allergies indicates:   Allergen Reactions    Adhesive tape-silicones Hives    Neomycin-bacitracin-polymyxin      Other reaction(s): makes area worse    Oxycodone      Other reaction(s): hoarseness  restless, rash    Dexamethasone Rash     pain in jaw, shoulder, ears, and rash        Immunization History  "  Administered Date(s) Administered    COVID-19, MRNA, LN-S, PF (MODERNA FULL 0.5 ML DOSE) 02/10/2021, 03/10/2021, 10/28/2021, 07/05/2022    COVID-19, mRNA, LNP-S, bivalent booster, PF (Moderna Omicron)12 + YEARS 10/27/2022    Influenza (FLUAD) - Quadrivalent - Adjuvanted - PF *Preferred* (65+) 09/29/2022, 10/18/2023    Influenza - High Dose - PF (65 years and older) 10/27/2015, 10/29/2019    Influenza - Quadrivalent - High Dose - PF (65 years and older) 10/06/2020    Influenza - Quadrivalent - PF *Preferred* (6 months and older) 09/29/2009    Influenza - Trivalent - PF (ADULT) 09/29/2009, 10/09/2014, 09/15/2016, 09/27/2017, 10/12/2018, 09/29/2021    Pneumococcal Conjugate - 13 Valent 08/24/2016    Pneumococcal Polysaccharide - 23 Valent 03/26/2018        Patient Care Team:  Sloan Rai MD as PCP - General (Internal Medicine)  Laura Escalante MD as Consulting Physician (Cardiology)  Juliano Yeboah MD as Consulting Physician (Urology)    Subjective:     Review of Systems    12 point review of systems conducted, negative except as stated in the history of present illness. See HPI for details.    Objective:     Visit Vitals  /75 (BP Location: Left arm, Patient Position: Sitting, BP Method: Medium (Manual))   Pulse 82   Temp 98.2 °F (36.8 °C)   Resp 16   Ht 5' 7" (1.702 m)   Wt 49.1 kg (108 lb 3.2 oz)   SpO2 99%   BMI 16.95 kg/m²       Physical Exam  Vitals and nursing note reviewed.   Constitutional:       General: She is not in acute distress.     Appearance: She is not ill-appearing.   HENT:      Head: Normocephalic and atraumatic.      Mouth/Throat:      Mouth: Mucous membranes are moist.      Pharynx: Oropharynx is clear.   Eyes:      General: No scleral icterus.     Extraocular Movements: Extraocular movements intact.      Conjunctiva/sclera: Conjunctivae normal.      Pupils: Pupils are equal, round, and reactive to light.   Neck:      Vascular: No carotid bruit.   Cardiovascular:      Rate " and Rhythm: Normal rate and regular rhythm.      Heart sounds: Murmur heard.      No friction rub. No gallop.   Pulmonary:      Effort: Pulmonary effort is normal. No respiratory distress.      Breath sounds: Normal breath sounds. No wheezing, rhonchi or rales.   Abdominal:      General: Abdomen is flat. Bowel sounds are normal. There is no distension.      Palpations: Abdomen is soft. There is no mass.      Tenderness: There is no abdominal tenderness.   Musculoskeletal:         General: Normal range of motion.      Cervical back: Normal range of motion and neck supple.   Skin:     General: Skin is warm and dry.   Neurological:      General: No focal deficit present.      Mental Status: She is alert.   Psychiatric:         Mood and Affect: Mood normal.         Labs Reviewed:     Chemistry:  Lab Results   Component Value Date     (L) 09/25/2023    K 4.4 09/25/2023    CHLORIDE 101 09/25/2023    BUN 18.9 09/25/2023    CREATININE 0.87 09/25/2023    EGFRNORACEVR >60 09/25/2023    GLUCOSE 132 (H) 09/25/2023    CALCIUM 9.5 09/25/2023    ALKPHOS 62 09/25/2023    LABPROT 7.0 09/25/2023    ALBUMIN 3.7 09/25/2023    BILIDIR 0.10 03/21/2019    IBILI 0.30 03/21/2019    AST 22 09/25/2023    ALT 9 09/25/2023    QZCIPPSW55SA 51.3 09/25/2023    TSH 2.5267 09/23/2022        Lab Results   Component Value Date    HGBA1C 5.6 09/23/2022        Hematology:  Lab Results   Component Value Date    WBC 5.89 09/25/2023    HGB 12.8 09/25/2023    HCT 39.9 09/25/2023     09/25/2023       Lipid Panel:  Lab Results   Component Value Date    CHOL 219 (H) 09/25/2023    HDL 73 (H) 09/25/2023    .00 09/25/2023    TRIG 71 09/25/2023    TOTALCHOLEST 3 09/25/2023        Urine:  Lab Results   Component Value Date    COLORUA Yellow 09/25/2023    APPEARANCEUA Clear 09/25/2023    SGUA 1.010 09/25/2023    PHUA 7.5 09/25/2023    PROTEINUA Negative 09/25/2023    GLUCOSEUA Negative 09/25/2023    KETONESUA Negative 09/25/2023    BLOODUA  Negative 09/25/2023    NITRITESUA Negative 09/25/2023    LEUKOCYTESUR Trace (A) 09/25/2023    RBCUA 0-5 09/25/2023    WBCUA <5 09/25/2023    BACTERIA None Seen 09/25/2023        Assessment:       ICD-10-CM ICD-9-CM   1. Well adult exam  Z00.00 V70.0   2. Primary hypertension  I10 401.9   3. History of renal cell cancer  Z85.528 V10.52   4. History of breast cancer  Z85.3 V10.3   5. Anxiety disorder, unspecified type  F41.9 300.00   6. Impaired fasting blood sugar  R73.01 790.21   7. Constipation, unspecified constipation type  K59.00 564.00   8. Aortic atherosclerosis  I70.0 440.0   9. Nonrheumatic aortic valve stenosis  I35.0 424.1   10. Need for vaccination  Z23 V05.9        Plan:     1. Well adult exam  Overview:    Cervical Cancer Screening - No longer indicated.   Breast Cancer Screening - No longer indicated. Declines screening.  Colon Cancer Screening - No longer indicated.  Osteoporosis Screening - Declines.  Eye Exam - Recommend annually.  Dental Exam - Recommend biannually  Vaccinations -   Immunization History   Administered Date(s) Administered    COVID-19, MRNA, LN-S, PF (MODERNA FULL 0.5 ML DOSE) 02/10/2021, 03/10/2021, 10/28/2021, 07/05/2022    COVID-19, mRNA, LNP-S, bivalent booster, PF (Moderna Omicron)12 + YEARS 10/27/2022    Influenza (FLUAD) - Quadrivalent - Adjuvanted - PF *Preferred* (65+) 09/29/2022, 10/18/2023    Influenza - High Dose - PF (65 years and older) 10/27/2015, 10/29/2019    Influenza - Quadrivalent - High Dose - PF (65 years and older) 10/06/2020    Influenza - Quadrivalent - PF *Preferred* (6 months and older) 09/29/2009    Influenza - Trivalent - PF (ADULT) 09/29/2009, 10/09/2014, 09/15/2016, 09/27/2017, 10/12/2018, 09/29/2021    Pneumococcal Conjugate - 13 Valent 08/24/2016    Pneumococcal Polysaccharide - 23 Valent 03/26/2018            2. Primary hypertension  Assessment & Plan:  Well controlled.   Continue Metoprolol 25mg ER daily  Low Sodium Diet (DASH Diet - Less than 2  grams of sodium per day).  Monitor blood pressure daily and log. Report consistent numbers greater than 140/90.  Maintain healthy weight with goal BMI <30. Exercise 30 minutes per day, 5 days per week.      3. History of renal cell cancer  Overview:  S/p partial nephrectomy   Followed by Dr. Yeboah annually      4. History of breast cancer  Overview:  Diagnosed in 1999  S/p right mastectomy + chemo      5. Anxiety disorder, unspecified type  Assessment & Plan:  Continue Amitriptyline 10mg at HS.  Practice deep breathing or abdominal breathing exercises when anxiety occurs.  Exercise daily. Get sunlight daily.  Avoid caffeine, alcohol and stimulants.  Practice positive phrases and repeat throughout the day, along with yoga and relaxation techniques.  Set healthy boundaries, avoid people and conversations that increase stress.  Educated patient on the risks of serotonin based medications such as serotonin modulators and SSRIs/SNRIs including common side effects of nausea, GI upset, headache dizziness as well as the rare risk for worsening symptoms of depression including development of suicidal thoughts or ideations, and serotonin syndrome.   Reports any symptoms of suicidal or homicidal ideations immediately, if clinic is closed go to nearest emergency room.      6. Impaired fasting blood sugar  -     Hemoglobin A1C; Future; Expected date: 01/18/2024  -     Comprehensive Metabolic Panel; Future; Expected date: 01/18/2024    7. Constipation, unspecified constipation type  Assessment & Plan:  Take Amitiza 8mcg BID as she is only taking PRN with more episodes of constipation      8. Aortic atherosclerosis  Assessment & Plan:  Followed by Dr. Escalante  Continue ASA daily      9. Nonrheumatic aortic valve stenosis  Assessment & Plan:  Asymptomatic  Followed by Dr. Escalante with annual ECHO      10. Need for vaccination  -     Influenza - Quadrivalent (Adjuvanted)         The following assessments were completed and reviewed.  See completed screening forms and assessments within the Encounter Summary.  [x] Health Risk Assessment   [x] CVD Risk Factors - Reviewed  [x] Obesity/Physical Activity -  Encouraged daily 30 minute physical activity x 5 days per week.   [x] Home Safety/Living Situation  [x] CAGE  [x] Depression (PHQ) Screen  [x] Timed Get Up and Go  [x] Whisper Test  [x] Cognitive Function/Impairment Screen  [x] Nutrition Screening  [x] ADL Screen  [x] Opioid Screen:  [x] Patient does not have a prescription for opioids.   [] Patient has a prescription for opioids but is at low risk for abuse.   [x] Substance Abuse Screen:   [x] Patient does not use substances.   [x] Advanced Care Planning:  Advance Care Planning   Date: 10/17/2023    [facility]  I engaged the patient in a voluntary conversation about advance care planning and we specifically addressed what the goals of care would be moving forward.  We did specifically address the patient's likely prognosis, which is good .  We explored the patient's values and preferences for future care.  The patient endorses that what is most important right now is to focus on avoiding the hospital and remaining as independent as possible          Provided patient with a 5-10 year written screening schedule and personal prevention plan. Recommendations were developed using the USPSTF age appropriate recommendations. Education, counseling, and referrals were provided as needed. After Visit Summary printed and given to patient, which includes a list of additional screenings\tests needed.    Follow up in about 3 months (around 1/18/2024) for Routine Follow Up. In addition to their scheduled follow up, the patient has also been instructed to follow up on as needed basis.     Future Appointments   Date Time Provider Department Center   1/23/2024  9:40 AM Sloan Rai MD [facility] [id number]          KENNETH Cha

## 2023-10-18 ENCOUNTER — OFFICE VISIT (OUTPATIENT)
Dept: INTERNAL MEDICINE | Facility: CLINIC | Age: 88
End: 2023-10-18
Payer: MEDICARE

## 2023-10-18 VITALS
SYSTOLIC BLOOD PRESSURE: 130 MMHG | TEMPERATURE: 98 F | DIASTOLIC BLOOD PRESSURE: 75 MMHG | WEIGHT: 108.19 LBS | BODY MASS INDEX: 16.98 KG/M2 | OXYGEN SATURATION: 99 % | HEIGHT: 67 IN | RESPIRATION RATE: 16 BRPM | HEART RATE: 82 BPM

## 2023-10-18 DIAGNOSIS — I70.0 AORTIC ATHEROSCLEROSIS: ICD-10-CM

## 2023-10-18 DIAGNOSIS — Z00.00 WELL ADULT EXAM: Primary | ICD-10-CM

## 2023-10-18 DIAGNOSIS — K59.00 CONSTIPATION, UNSPECIFIED CONSTIPATION TYPE: ICD-10-CM

## 2023-10-18 DIAGNOSIS — Z85.3 HISTORY OF BREAST CANCER: ICD-10-CM

## 2023-10-18 DIAGNOSIS — I10 PRIMARY HYPERTENSION: ICD-10-CM

## 2023-10-18 DIAGNOSIS — Z23 NEED FOR VACCINATION: ICD-10-CM

## 2023-10-18 DIAGNOSIS — I35.0 NONRHEUMATIC AORTIC VALVE STENOSIS: ICD-10-CM

## 2023-10-18 DIAGNOSIS — F41.9 ANXIETY DISORDER, UNSPECIFIED TYPE: ICD-10-CM

## 2023-10-18 DIAGNOSIS — R73.01 IMPAIRED FASTING BLOOD SUGAR: ICD-10-CM

## 2023-10-18 DIAGNOSIS — Z85.528 HISTORY OF RENAL CELL CANCER: ICD-10-CM

## 2023-10-18 PROBLEM — N28.89 RENAL MASS: Status: RESOLVED | Noted: 2022-09-29 | Resolved: 2023-10-18

## 2023-10-18 PROBLEM — C64.9 MALIGNANT NEOPLASM OF KIDNEY: Status: RESOLVED | Noted: 2022-09-29 | Resolved: 2023-10-18

## 2023-10-18 PROCEDURE — 1160F PR REVIEW ALL MEDS BY PRESCRIBER/CLIN PHARMACIST DOCUMENTED: ICD-10-PCS | Mod: CPTII,,, | Performed by: NURSE PRACTITIONER

## 2023-10-18 PROCEDURE — 1101F PR PT FALLS ASSESS DOC 0-1 FALLS W/OUT INJ PAST YR: ICD-10-PCS | Mod: CPTII,,, | Performed by: NURSE PRACTITIONER

## 2023-10-18 PROCEDURE — 3288F FALL RISK ASSESSMENT DOCD: CPT | Mod: CPTII,,, | Performed by: NURSE PRACTITIONER

## 2023-10-18 PROCEDURE — G0008 ADMIN INFLUENZA VIRUS VAC: HCPCS | Mod: ,,, | Performed by: NURSE PRACTITIONER

## 2023-10-18 PROCEDURE — 1158F ADVNC CARE PLAN TLK DOCD: CPT | Mod: CPTII,,, | Performed by: NURSE PRACTITIONER

## 2023-10-18 PROCEDURE — G0008 FLU VACCINE - QUADRIVALENT - ADJUVANTED: ICD-10-PCS | Mod: ,,, | Performed by: NURSE PRACTITIONER

## 2023-10-18 PROCEDURE — 1159F MED LIST DOCD IN RCRD: CPT | Mod: CPTII,,, | Performed by: NURSE PRACTITIONER

## 2023-10-18 PROCEDURE — 90694 FLU VACCINE - QUADRIVALENT - ADJUVANTED: ICD-10-PCS | Mod: ,,, | Performed by: NURSE PRACTITIONER

## 2023-10-18 PROCEDURE — 1159F PR MEDICATION LIST DOCUMENTED IN MEDICAL RECORD: ICD-10-PCS | Mod: CPTII,,, | Performed by: NURSE PRACTITIONER

## 2023-10-18 PROCEDURE — 1158F PR ADVANCE CARE PLANNING DISCUSS DOCUMENTED IN MEDICAL RECORD: ICD-10-PCS | Mod: CPTII,,, | Performed by: NURSE PRACTITIONER

## 2023-10-18 PROCEDURE — 1101F PT FALLS ASSESS-DOCD LE1/YR: CPT | Mod: CPTII,,, | Performed by: NURSE PRACTITIONER

## 2023-10-18 PROCEDURE — G0439 PR MEDICARE ANNUAL WELLNESS SUBSEQUENT VISIT: ICD-10-PCS | Mod: ,,, | Performed by: NURSE PRACTITIONER

## 2023-10-18 PROCEDURE — G0439 PPPS, SUBSEQ VISIT: HCPCS | Mod: ,,, | Performed by: NURSE PRACTITIONER

## 2023-10-18 PROCEDURE — 3288F PR FALLS RISK ASSESSMENT DOCUMENTED: ICD-10-PCS | Mod: CPTII,,, | Performed by: NURSE PRACTITIONER

## 2023-10-18 PROCEDURE — 1160F RVW MEDS BY RX/DR IN RCRD: CPT | Mod: CPTII,,, | Performed by: NURSE PRACTITIONER

## 2023-10-18 PROCEDURE — 90694 VACC AIIV4 NO PRSRV 0.5ML IM: CPT | Mod: ,,, | Performed by: NURSE PRACTITIONER

## 2023-10-18 RX ORDER — ASCORBIC ACID 125 MG
5000 TABLET,CHEWABLE ORAL DAILY
COMMUNITY

## 2023-10-18 RX ORDER — ASPIRIN 81 MG/1
81 TABLET ORAL DAILY
COMMUNITY

## 2023-10-18 RX ORDER — PSYLLIUM HUSK 0.4 G
800 CAPSULE ORAL ONCE
COMMUNITY

## 2023-10-18 RX ORDER — DOCUSATE SODIUM 250 MG
250 CAPSULE ORAL DAILY
COMMUNITY
End: 2024-01-23

## 2023-10-18 NOTE — ASSESSMENT & PLAN NOTE
Continue Amitriptyline 10mg at HS.  Practice deep breathing or abdominal breathing exercises when anxiety occurs.  Exercise daily. Get sunlight daily.  Avoid caffeine, alcohol and stimulants.  Practice positive phrases and repeat throughout the day, along with yoga and relaxation techniques.  Set healthy boundaries, avoid people and conversations that increase stress.  Educated patient on the risks of serotonin based medications such as serotonin modulators and SSRIs/SNRIs including common side effects of nausea, GI upset, headache dizziness as well as the rare risk for worsening symptoms of depression including development of suicidal thoughts or ideations, and serotonin syndrome.   Reports any symptoms of suicidal or homicidal ideations immediately, if clinic is closed go to nearest emergency room.

## 2023-10-18 NOTE — ASSESSMENT & PLAN NOTE
Well controlled.   Continue Metoprolol 25mg ER daily  Low Sodium Diet (DASH Diet - Less than 2 grams of sodium per day).  Monitor blood pressure daily and log. Report consistent numbers greater than 140/90.  Maintain healthy weight with goal BMI <30. Exercise 30 minutes per day, 5 days per week.

## 2023-12-19 DIAGNOSIS — C64.9 MALIGNANT NEOPLASM OF KIDNEY, UNSPECIFIED LATERALITY: ICD-10-CM

## 2023-12-20 RX ORDER — AMITRIPTYLINE HYDROCHLORIDE 10 MG/1
10 TABLET, FILM COATED ORAL NIGHTLY
Qty: 90 TABLET | Refills: 3 | Status: SHIPPED | OUTPATIENT
Start: 2023-12-20

## 2024-01-09 ENCOUNTER — TELEPHONE (OUTPATIENT)
Dept: INTERNAL MEDICINE | Facility: CLINIC | Age: 89
End: 2024-01-09
Payer: MEDICARE

## 2024-01-09 NOTE — TELEPHONE ENCOUNTER
----- Message from Juan Vargas MA sent at 1/9/2024  8:09 AM CST -----  Regarding: PV 1/23/24 @ 9:40 Dr. Macias  1. Are there any outstanding tasks in the patient's chart? Yes, fasting labs    2. Is there any documentation in the chart? No    3.Has patient been seen in an ER, Urgent care clinic, or been admitted since last visit?  If yes, When, where, and why    4. Has patient seen any other healthcare providers since last visit?  If yes, when, where, and why    5. Has patient had any bloodwork or XR done since last visit?    6. Is patient signed up for patient portal?

## 2024-01-18 ENCOUNTER — LAB VISIT (OUTPATIENT)
Dept: LAB | Facility: HOSPITAL | Age: 89
End: 2024-01-18
Attending: NURSE PRACTITIONER
Payer: MEDICARE

## 2024-01-18 DIAGNOSIS — R73.01 IMPAIRED FASTING BLOOD SUGAR: ICD-10-CM

## 2024-01-18 LAB
ALBUMIN SERPL-MCNC: 4 G/DL (ref 3.4–4.8)
ALBUMIN/GLOB SERPL: 1.1 RATIO (ref 1.1–2)
ALP SERPL-CCNC: 76 UNIT/L (ref 40–150)
ALT SERPL-CCNC: 14 UNIT/L (ref 0–55)
AST SERPL-CCNC: 25 UNIT/L (ref 5–34)
BILIRUB SERPL-MCNC: 0.6 MG/DL
BUN SERPL-MCNC: 22.1 MG/DL (ref 9.8–20.1)
CALCIUM SERPL-MCNC: 10 MG/DL (ref 8.4–10.2)
CHLORIDE SERPL-SCNC: 103 MMOL/L (ref 98–107)
CO2 SERPL-SCNC: 29 MMOL/L (ref 23–31)
CREAT SERPL-MCNC: 0.85 MG/DL (ref 0.55–1.02)
EST. AVERAGE GLUCOSE BLD GHB EST-MCNC: 114 MG/DL
GFR SERPLBLD CREATININE-BSD FMLA CKD-EPI: >60 MLS/MIN/1.73/M2
GLOBULIN SER-MCNC: 3.7 GM/DL (ref 2.4–3.5)
GLUCOSE SERPL-MCNC: 106 MG/DL (ref 82–115)
HBA1C MFR BLD: 5.6 %
POTASSIUM SERPL-SCNC: 4.4 MMOL/L (ref 3.5–5.1)
PROT SERPL-MCNC: 7.7 GM/DL (ref 5.8–7.6)
SODIUM SERPL-SCNC: 138 MMOL/L (ref 136–145)

## 2024-01-18 PROCEDURE — 83036 HEMOGLOBIN GLYCOSYLATED A1C: CPT

## 2024-01-18 PROCEDURE — 36415 COLL VENOUS BLD VENIPUNCTURE: CPT

## 2024-01-18 PROCEDURE — 80053 COMPREHEN METABOLIC PANEL: CPT

## 2024-01-22 DIAGNOSIS — K59.00 CONSTIPATION, UNSPECIFIED CONSTIPATION TYPE: ICD-10-CM

## 2024-01-22 PROBLEM — Z00.00 WELL ADULT EXAM: Status: RESOLVED | Noted: 2022-09-29 | Resolved: 2024-01-22

## 2024-01-22 RX ORDER — LUBIPROSTONE 8 UG/1
8 CAPSULE ORAL 2 TIMES DAILY PRN
Qty: 180 CAPSULE | Refills: 3 | Status: SHIPPED | OUTPATIENT
Start: 2024-01-22

## 2024-01-23 ENCOUNTER — OFFICE VISIT (OUTPATIENT)
Dept: INTERNAL MEDICINE | Facility: CLINIC | Age: 89
End: 2024-01-23
Payer: MEDICARE

## 2024-01-23 VITALS
WEIGHT: 105 LBS | SYSTOLIC BLOOD PRESSURE: 134 MMHG | HEART RATE: 68 BPM | RESPIRATION RATE: 16 BRPM | BODY MASS INDEX: 16.48 KG/M2 | HEIGHT: 67 IN | DIASTOLIC BLOOD PRESSURE: 82 MMHG | OXYGEN SATURATION: 98 % | TEMPERATURE: 97 F

## 2024-01-23 DIAGNOSIS — R63.4 WEIGHT LOSS: Primary | ICD-10-CM

## 2024-01-23 DIAGNOSIS — K59.00 CONSTIPATION, UNSPECIFIED CONSTIPATION TYPE: ICD-10-CM

## 2024-01-23 PROBLEM — I70.0 AORTIC ATHEROSCLEROSIS: Status: RESOLVED | Noted: 2023-10-18 | Resolved: 2024-01-23

## 2024-01-23 PROBLEM — R73.01 IMPAIRED FASTING BLOOD SUGAR: Status: RESOLVED | Noted: 2023-10-18 | Resolved: 2024-01-23

## 2024-01-23 PROCEDURE — 3288F FALL RISK ASSESSMENT DOCD: CPT | Mod: CPTII,,, | Performed by: INTERNAL MEDICINE

## 2024-01-23 PROCEDURE — 1101F PT FALLS ASSESS-DOCD LE1/YR: CPT | Mod: CPTII,,, | Performed by: INTERNAL MEDICINE

## 2024-01-23 PROCEDURE — 1160F RVW MEDS BY RX/DR IN RCRD: CPT | Mod: CPTII,,, | Performed by: INTERNAL MEDICINE

## 2024-01-23 PROCEDURE — 1159F MED LIST DOCD IN RCRD: CPT | Mod: CPTII,,, | Performed by: INTERNAL MEDICINE

## 2024-01-23 PROCEDURE — 99213 OFFICE O/P EST LOW 20 MIN: CPT | Mod: ,,, | Performed by: INTERNAL MEDICINE

## 2024-01-23 NOTE — ASSESSMENT & PLAN NOTE
I encourage patient to eat whatever she wants given her age certainly not worried about her blood sugar I find that she is calorie restricting herself in an effort to monitor her blood sugar closer I think that has a bad idea she should be eating whatever she wants we discussed further workup for weight loss including CT scan we both agree that at her age if we found something we would not want to do anything about it so we are not going to investigate that any further I am going to see her back in 3 months to make sure her weight is stable.  She reports that her son cooks great I encouraged her to also do some meal replacement shakes in between her meals and she is taking her amitriptyline at night which should be increasing her appetite and she said she has a good appetite

## 2024-01-23 NOTE — PROGRESS NOTES
Internal Medicine    Patient ID: [id number]     Chief Complaint: Hyperglycemia (3 month f/u)      HPI:     Mary Pedraza is a 88 y.o. female here today for a follow up.   Past medical history of breast cancer in 1999. Right mastectomy.  Last left mammogram in January 2019.  Patient no longer wants to do mammograms.  Had 9 siblings at one time; now only 2 are living  Dr. Hernandez-cardiology.  Abnormal PET in July 2018, coronary angiogram done at that time which showed clean coronaries.  Aortic stenosis; moderate/ severe. Has some Dyspnea, no syncope, has some pre- syncope  Dr. 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  Last colonoscopy in January 2019. Off mesalamine  Dr. Yeboah-urology history of renal cell carcinoma status post partial nephrectomy. Goes to see him once a year with scans.   Patient with history of osteopenia, no longer wants bone densities  Had kyphoplasty recently  On Amitiza for constipation    She is here today for a follow up on her blood sugar she has been watching her diet and carbohydrate intake her weight is down 4 lb since her last visit I strongly encouraged her to eat whatever she wants she reports having a good appetite.  At her age I certainly not worried about her blood sugar she states she feels great she has no nausea no vomiting; absolutely no issues we discussed further workup for weight loss but it would potentially lead to some further testing and she does not want to do that given her age which I completely agree with    Past Medical History:   Diagnosis Date    Anemia, unspecified     Anxiety disorder, unspecified     Bilateral cataracts     Bruises easily     Gastro-esophageal reflux disease without esophagitis     Generalized arthritis     History of breast cancer 10/18/2023    History of renal cell cancer 10/18/2023    HTN (hypertension)     Impaired fasting blood sugar 10/18/2023    Other specified noninfective gastroenteritis and colitis     Panic  "attacks     Personal history of colonic polyps         History reviewed. No pertinent surgical history.     Social History     Tobacco Use    Smoking status: Never    Smokeless tobacco: Never   Substance and Sexual Activity    Alcohol use: Not Currently    Drug use: Never    Sexual activity: Not Currently        Current Outpatient Medications   Medication Instructions    amitriptyline (ELAVIL) 10 mg, Oral, Nightly    aspirin (ECOTRIN) 81 mg, Oral, Daily    biotin 5,000 mg, Oral, Daily    calcium carbonate-vitamin D3 1,000 mg-20 mcg (800 unit) Tab 800 mg, Oral, Once    lubiprostone (AMITIZA) 8 mcg, Oral, 2 times daily PRN    metoprolol succinate (TOPROL-XL) 25 mg, Oral, Daily    pantoprazole (PROTONIX) 40 mg, Oral, Every morning       Review of patient's allergies indicates:   Allergen Reactions    Adhesive tape-silicones Hives    Neomycin-bacitracin-polymyxin      Other reaction(s): makes area worse    Oxycodone      Other reaction(s): hoarseness  restless, rash    Dexamethasone Rash     pain in jaw, shoulder, ears, and rash        Patient Care Team:  Sloan Rai MD as PCP - General (Internal Medicine)  Laura Escalante MD as Consulting Physician (Cardiology)  Juliano Yeboah MD as Consulting Physician (Urology)     Subjective:     Review of Systems    12 point review of systems conducted, negative except as stated in the history of present illness. See HPI for details.    Objective:     Visit Vitals  /82 (BP Location: Left arm, Patient Position: Sitting, BP Method: Medium (Manual))   Pulse 68   Temp 97.4 °F (36.3 °C) (Temporal)   Resp 16   Ht 5' 7" (1.702 m)   Wt 47.6 kg (105 lb)   SpO2 98%   BMI 16.45 kg/m²       Physical Exam  General : Alert and oriented, No acute distress, afebrile.  Eye : PERRLA. EOMI. Normal conjunctiva, Sclerae are nonicteric.   Respiratory : Respirations are non-labored and clear to auscultation bilaterally. Symmetrical air entry bilaterally, no crackles, no wheezes, no " rhonchi. No cyanosis, no clubbing.  Cardiovascular : Normal rate, Regular rhythm. No murmurs, rubs, or gallops. Pulses are 2+ throughout. No JVD. No Edema.  Gastrointestinal : Soft, nontender, non-distended, bowel sounds are present in all quadrants, no organomegaly, no guarding, no rebound.  Musculoskeletal : Normal range of motion throughout. No muscle tenderness.  Integumentary : Warm, moist, intact.  Neurologic : Alert, Oriented  Psychiatric : Cooperative, Appropriate mood & affect.   Labs Reviewed:     Chemistry:  Lab Results   Component Value Date     01/18/2024    K 4.4 01/18/2024    CHLORIDE 103 01/18/2024    BUN 22.1 (H) 01/18/2024    CREATININE 0.85 01/18/2024    EGFRNORACEVR >60 01/18/2024    GLUCOSE 106 01/18/2024    CALCIUM 10.0 01/18/2024    ALKPHOS 76 01/18/2024    LABPROT 7.7 (H) 01/18/2024    ALBUMIN 4.0 01/18/2024    BILIDIR 0.10 03/21/2019    IBILI 0.30 03/21/2019    AST 25 01/18/2024    ALT 14 01/18/2024    [id number] 51.3 09/25/2023    TSH 2.5267 09/23/2022        Lab Results   Component Value Date    HGBA1C 5.6 01/18/2024        Hematology:  Lab Results   Component Value Date    WBC 5.89 09/25/2023    HGB 12.8 09/25/2023    HCT 39.9 09/25/2023     09/25/2023       Lipid Panel:  Lab Results   Component Value Date    CHOL 219 (H) 09/25/2023    HDL 73 (H) 09/25/2023    .00 09/25/2023    TRIG 71 09/25/2023    TOTALCHOLEST 3 09/25/2023        Urine:  Lab Results   Component Value Date    COLORUA Yellow 09/25/2023    APPEARANCEUA Clear 09/25/2023    SGUA 1.010 09/25/2023    PHUA 7.5 09/25/2023    PROTEINUA Negative 09/25/2023    GLUCOSEUA Negative 09/25/2023    KETONESUA Negative 09/25/2023    BLOODUA Negative 09/25/2023    NITRITESUA Negative 09/25/2023    LEUKOCYTESUR Trace (A) 09/25/2023    RBCUA 0-5 09/25/2023    WBCUA <5 09/25/2023    BACTERIA None Seen 09/25/2023        Assessment:       ICD-10-CM ICD-9-CM   1. Weight loss  R63.4 783.21   2. Constipation, unspecified  constipation type  K59.00 564.00        Plan:     1. Weight loss  Assessment & Plan:  I encourage patient to eat whatever she wants given her age certainly not worried about her blood sugar I find that she is calorie restricting herself in an effort to monitor her blood sugar closer I think that has a bad idea she should be eating whatever she wants we discussed further workup for weight loss including CT scan we both agree that at her age if we found something we would not want to do anything about it so we are not going to investigate that any further I am going to see her back in 3 months to make sure her weight is stable.  She reports that her son cooks great I encouraged her to also do some meal replacement shakes in between her meals and she is taking her amitriptyline at night which should be increasing her appetite and she said she has a good appetite      2. Constipation, unspecified constipation type  Assessment & Plan:  Stable on current dose of Amitiza no issues             Follow up in about 3 months (around 4/23/2024). In addition to their scheduled follow up, the patient has also been instructed to follow up on as needed basis.     Future Appointments   Date Time Provider Department Center   5/6/2024  1:00 PM Sloan Rai MD [facility] [id number]        Sloan Rai MD

## 2024-05-06 ENCOUNTER — LAB VISIT (OUTPATIENT)
Dept: LAB | Facility: HOSPITAL | Age: 89
End: 2024-05-06
Attending: INTERNAL MEDICINE
Payer: MEDICARE

## 2024-05-06 ENCOUNTER — TELEPHONE (OUTPATIENT)
Dept: INTERNAL MEDICINE | Facility: CLINIC | Age: 89
End: 2024-05-06

## 2024-05-06 ENCOUNTER — OFFICE VISIT (OUTPATIENT)
Dept: INTERNAL MEDICINE | Facility: CLINIC | Age: 89
End: 2024-05-06
Payer: MEDICARE

## 2024-05-06 VITALS
SYSTOLIC BLOOD PRESSURE: 124 MMHG | BODY MASS INDEX: 16.48 KG/M2 | TEMPERATURE: 97 F | DIASTOLIC BLOOD PRESSURE: 80 MMHG | HEIGHT: 67 IN | HEART RATE: 74 BPM | WEIGHT: 105 LBS | OXYGEN SATURATION: 99 % | RESPIRATION RATE: 16 BRPM

## 2024-05-06 DIAGNOSIS — H53.9 VISION CHANGES: ICD-10-CM

## 2024-05-06 DIAGNOSIS — M31.6 TEMPORAL ARTERITIS SYNDROME: ICD-10-CM

## 2024-05-06 DIAGNOSIS — I77.6 ARTERITIS, UNSPECIFIED: ICD-10-CM

## 2024-05-06 DIAGNOSIS — R29.818 OTHER SYMPTOMS AND SIGNS INVOLVING THE NERVOUS SYSTEM: ICD-10-CM

## 2024-05-06 DIAGNOSIS — H53.9 VISION CHANGES: Primary | ICD-10-CM

## 2024-05-06 LAB
ALBUMIN SERPL-MCNC: 4 G/DL (ref 3.4–4.8)
ALBUMIN/GLOB SERPL: 1.1 RATIO (ref 1.1–2)
ALP SERPL-CCNC: 70 UNIT/L (ref 40–150)
ALT SERPL-CCNC: 17 UNIT/L (ref 0–55)
AST SERPL-CCNC: 28 UNIT/L (ref 5–34)
BASOPHILS # BLD AUTO: 0.04 X10(3)/MCL
BASOPHILS NFR BLD AUTO: 0.5 %
BILIRUB SERPL-MCNC: 0.4 MG/DL
BUN SERPL-MCNC: 23.6 MG/DL (ref 9.8–20.1)
CALCIUM SERPL-MCNC: 9.9 MG/DL (ref 8.4–10.2)
CHLORIDE SERPL-SCNC: 99 MMOL/L (ref 98–107)
CO2 SERPL-SCNC: 29 MMOL/L (ref 23–31)
CREAT SERPL-MCNC: 1.05 MG/DL (ref 0.55–1.02)
CRP SERPL-MCNC: <1 MG/L
EOSINOPHIL # BLD AUTO: 0.11 X10(3)/MCL (ref 0–0.9)
EOSINOPHIL NFR BLD AUTO: 1.5 %
ERYTHROCYTE [DISTWIDTH] IN BLOOD BY AUTOMATED COUNT: 12.9 % (ref 11.5–17)
ERYTHROCYTE [SEDIMENTATION RATE] IN BLOOD: 37 MM/HR (ref 0–20)
GFR SERPLBLD CREATININE-BSD FMLA CKD-EPI: 51 MLS/MIN/1.73/M2
GLOBULIN SER-MCNC: 3.5 GM/DL (ref 2.4–3.5)
GLUCOSE SERPL-MCNC: 131 MG/DL (ref 82–115)
HCT VFR BLD AUTO: 38.9 % (ref 37–47)
HGB BLD-MCNC: 12.8 G/DL (ref 12–16)
IMM GRANULOCYTES # BLD AUTO: 0.02 X10(3)/MCL (ref 0–0.04)
IMM GRANULOCYTES NFR BLD AUTO: 0.3 %
LYMPHOCYTES # BLD AUTO: 1.8 X10(3)/MCL (ref 0.6–4.6)
LYMPHOCYTES NFR BLD AUTO: 24.2 %
MCH RBC QN AUTO: 32.4 PG (ref 27–31)
MCHC RBC AUTO-ENTMCNC: 32.9 G/DL (ref 33–36)
MCV RBC AUTO: 98.5 FL (ref 80–94)
MONOCYTES # BLD AUTO: 0.66 X10(3)/MCL (ref 0.1–1.3)
MONOCYTES NFR BLD AUTO: 8.9 %
NEUTROPHILS # BLD AUTO: 4.82 X10(3)/MCL (ref 2.1–9.2)
NEUTROPHILS NFR BLD AUTO: 64.6 %
NRBC BLD AUTO-RTO: 0 %
PLATELET # BLD AUTO: 170 X10(3)/MCL (ref 130–400)
PMV BLD AUTO: 10.4 FL (ref 7.4–10.4)
POTASSIUM SERPL-SCNC: 4.5 MMOL/L (ref 3.5–5.1)
PROT SERPL-MCNC: 7.5 GM/DL (ref 5.8–7.6)
RBC # BLD AUTO: 3.95 X10(6)/MCL (ref 4.2–5.4)
SODIUM SERPL-SCNC: 135 MMOL/L (ref 136–145)
WBC # SPEC AUTO: 7.45 X10(3)/MCL (ref 4.5–11.5)

## 2024-05-06 PROCEDURE — 99214 OFFICE O/P EST MOD 30 MIN: CPT | Mod: ,,, | Performed by: INTERNAL MEDICINE

## 2024-05-06 PROCEDURE — 1101F PT FALLS ASSESS-DOCD LE1/YR: CPT | Mod: CPTII,,, | Performed by: INTERNAL MEDICINE

## 2024-05-06 PROCEDURE — 3288F FALL RISK ASSESSMENT DOCD: CPT | Mod: CPTII,,, | Performed by: INTERNAL MEDICINE

## 2024-05-06 PROCEDURE — 85025 COMPLETE CBC W/AUTO DIFF WBC: CPT

## 2024-05-06 PROCEDURE — 36415 COLL VENOUS BLD VENIPUNCTURE: CPT

## 2024-05-06 PROCEDURE — 85652 RBC SED RATE AUTOMATED: CPT

## 2024-05-06 PROCEDURE — 86140 C-REACTIVE PROTEIN: CPT

## 2024-05-06 PROCEDURE — 1160F RVW MEDS BY RX/DR IN RCRD: CPT | Mod: CPTII,,, | Performed by: INTERNAL MEDICINE

## 2024-05-06 PROCEDURE — 80053 COMPREHEN METABOLIC PANEL: CPT

## 2024-05-06 PROCEDURE — 1159F MED LIST DOCD IN RCRD: CPT | Mod: CPTII,,, | Performed by: INTERNAL MEDICINE

## 2024-05-06 RX ORDER — PREDNISONE 10 MG/1
TABLET ORAL
Qty: 23 TABLET | Refills: 0 | Status: SHIPPED | OUTPATIENT
Start: 2024-05-06 | End: 2024-05-20

## 2024-05-06 NOTE — TELEPHONE ENCOUNTER
----- Message from Jazmyne Duarte sent at 5/6/2024  2:23 PM CDT -----  Who Called: Mary Z Melancon    Caller is requesting assistance/information from provider's office.    Preferred Method of Contact: Phone Call  Patient's Preferred Phone Number on File: [phone] or [phone] (son carson)  Best Call Back Number, if different:  Additional Information:  pt son called to discuss pt medical health , please advise, asap, thanks

## 2024-05-06 NOTE — PROGRESS NOTES
Internal Medicine    Patient ID: [id number]     Chief Complaint: Weight Loss (3 month f/u)      HPI:     Mary Pedraza is a 88 y.o. female here today for a follow up.   Patient actually presenting today for weight loss revisit good news is her weight is stable however she has some new complaints which have been present for about the past 4 weeks she did not call us to let us know about these changes she reports blurry vision to the left eye for the past 4 weeks she denies that it is affecting any particular visual field but is the whole eye.  The blurry vision does not wax and wane that is there constantly, she has no photophobia, no diplopia.  She complains of pain in her left temple and also pain that radiates into the left jaw.  She reports no difficulty with mastication she denies any hip or shoulder girdle problems.    No difficulties with balance; denies any falls  No recent med changes  Past Medical History:   Diagnosis Date    Anemia, unspecified     Anxiety disorder, unspecified     Bilateral cataracts     Bruises easily     Gastro-esophageal reflux disease without esophagitis     Generalized arthritis     History of breast cancer 10/18/2023    History of renal cell cancer 10/18/2023    HTN (hypertension)     Impaired fasting blood sugar 10/18/2023    Other specified noninfective gastroenteritis and colitis     Panic attacks     Personal history of colonic polyps         No past surgical history on file.     Social History     Tobacco Use    Smoking status: Never    Smokeless tobacco: Never   Substance and Sexual Activity    Alcohol use: Not Currently    Drug use: Never    Sexual activity: Not Currently        Current Outpatient Medications   Medication Instructions    amitriptyline (ELAVIL) 10 mg, Oral, Nightly    aspirin (ECOTRIN) 81 mg, Oral, Daily    biotin 5,000 mg, Oral, Daily    calcium carbonate-vitamin D3 1,000 mg-20 mcg (800 unit) Tab 800 mg, Oral, Daily    lubiprostone (AMITIZA) 8 mcg, Oral, 2  "times daily PRN    metoprolol succinate (TOPROL-XL) 25 mg, Oral, Daily    pantoprazole (PROTONIX) 40 mg, Oral, Every morning    predniSONE (DELTASONE) 10 MG tablet Take 4 tablets (40 mg total) by mouth once daily for 1 day, THEN 3 tablets (30 mg total) once daily for 2 days, THEN 2 tablets (20 mg total) once daily for 3 days, THEN 1 tablet (10 mg total) once daily for 4 days, THEN 0.5 tablets (5 mg total) once daily for 5 days.       Review of patient's allergies indicates:   Allergen Reactions    Adhesive tape-silicones Hives    Neomycin-bacitracin-polymyxin      Other reaction(s): makes area worse    Oxycodone      Other reaction(s): hoarseness  restless, rash    Dexamethasone Rash     pain in jaw, shoulder, ears, and rash        Patient Care Team:  Sloan Rai MD as PCP - General (Internal Medicine)  Laura Escalante MD as Consulting Physician (Cardiology)  Juliano Yeboah MD as Consulting Physician (Urology)     Subjective:     Review of Systems   HENT:  Positive for dental problem.    Eyes:  Positive for visual disturbance.   Neurological:  Positive for headaches.       12 point review of systems conducted, negative except as stated in the history of present illness. See HPI for details.    Objective:     Visit Vitals  /80 (BP Location: Left arm, Patient Position: Sitting, BP Method: Medium (Manual))   Pulse 74   Temp 97.3 °F (36.3 °C) (Temporal)   Resp 16   Ht 5' 7" (1.702 m)   Wt 47.6 kg (105 lb)   SpO2 99%   BMI 16.45 kg/m²       Physical Exam  Constitutional:       General: She is not in acute distress.     Appearance: Normal appearance. She is not toxic-appearing.   HENT:      Head: Normocephalic and atraumatic.      Mouth/Throat:      Mouth: Mucous membranes are moist.      Pharynx: Oropharynx is clear.   Eyes:      Extraocular Movements: Extraocular movements intact.      Conjunctiva/sclera: Conjunctivae normal.      Pupils: Pupils are equal, round, and reactive to light. "   Cardiovascular:      Rate and Rhythm: Normal rate and regular rhythm.      Pulses: Normal pulses.      Heart sounds: S1 normal and S2 normal. No murmur heard.     No gallop.   Pulmonary:      Effort: Pulmonary effort is normal. No respiratory distress.      Breath sounds: Normal breath sounds. No decreased air movement.   Abdominal:      General: Bowel sounds are normal.      Palpations: Abdomen is soft.   Musculoskeletal:         General: Normal range of motion.      Cervical back: Normal range of motion and neck supple.      Comments: Patient with no tenderness over the area of the left temporal artery   Skin:     General: Skin is warm and dry.      Capillary Refill: Capillary refill takes less than 2 seconds.      Coloration: Skin is not cyanotic.   Neurological:      General: No focal deficit present.      Mental Status: She is alert and oriented to person, place, and time.      Sensory: Sensation is intact.      Motor: Motor function is intact.   Psychiatric:         Mood and Affect: Mood and affect normal.         Behavior: Behavior is cooperative.         Labs Reviewed:     Chemistry:  Lab Results   Component Value Date     01/18/2024    K 4.4 01/18/2024    CHLORIDE 103 01/18/2024    BUN 22.1 (H) 01/18/2024    CREATININE 0.85 01/18/2024    EGFRNORACEVR >60 01/18/2024    GLUCOSE 106 01/18/2024    CALCIUM 10.0 01/18/2024    ALKPHOS 76 01/18/2024    LABPROT 7.7 (H) 01/18/2024    ALBUMIN 4.0 01/18/2024    BILIDIR 0.10 03/21/2019    IBILI 0.30 03/21/2019    AST 25 01/18/2024    ALT 14 01/18/2024    DQQFHHTC64QU 51.3 09/25/2023    TSH 2.5267 09/23/2022        Lab Results   Component Value Date    HGBA1C 5.6 01/18/2024        Hematology:  Lab Results   Component Value Date    WBC 5.89 09/25/2023    HGB 12.8 09/25/2023    HCT 39.9 09/25/2023     09/25/2023       Lipid Panel:  Lab Results   Component Value Date    CHOL 219 (H) 09/25/2023    HDL 73 (H) 09/25/2023    .00 09/25/2023    TRIG 71  09/25/2023    TOTALCHOLEST 3 09/25/2023        Urine:  Lab Results   Component Value Date    COLORUA Yellow 09/25/2023    APPEARANCEUA Clear 09/25/2023    SGUA 1.010 09/25/2023    PHUA 7.5 09/25/2023    PROTEINUA Negative 09/25/2023    GLUCOSEUA Negative 09/25/2023    KETONESUA Negative 09/25/2023    BLOODUA Negative 09/25/2023    NITRITESUA Negative 09/25/2023    LEUKOCYTESUR Trace (A) 09/25/2023    RBCUA 0-5 09/25/2023    WBCUA <5 09/25/2023    BACTERIA None Seen 09/25/2023        Assessment:       ICD-10-CM ICD-9-CM   1. Vision changes  H53.9 368.9   2. Temporal arteritis syndrome  M31.6 446.5   3. Arteritis, unspecified  I77.6 447.6   4. Other symptoms and signs involving the nervous system  R29.818 781.99        Plan:     1. Vision changes  -     Ambulatory referral/consult to ENT; Future; Expected date: 05/13/2024  -     Ambulatory referral/consult to Ophthalmology; Future; Expected date: 05/13/2024  -     Cancel: Sedimentation rate; Future; Expected date: 05/06/2024  -     Cancel: C-Reactive Protein; Future; Expected date: 05/06/2024  -     Cancel: CBC Auto Differential; Future; Expected date: 05/06/2024  -     Cancel: Comprehensive Metabolic Panel; Future; Expected date: 05/06/2024  -     Sedimentation rate; Future; Expected date: 05/06/2024  -     C-Reactive Protein; Future; Expected date: 05/06/2024  -     CBC Auto Differential; Future; Expected date: 05/06/2024  -     Comprehensive Metabolic Panel; Future; Expected date: 05/06/2024    2. Temporal arteritis syndrome  -     Ambulatory referral/consult to ENT; Future; Expected date: 05/13/2024  -     Ambulatory referral/consult to Ophthalmology; Future; Expected date: 05/13/2024  -     Cancel: Sedimentation rate; Future; Expected date: 05/06/2024  -     Cancel: C-Reactive Protein; Future; Expected date: 05/06/2024  -     Cancel: CBC Auto Differential; Future; Expected date: 05/06/2024  -     Cancel: Comprehensive Metabolic Panel; Future; Expected date:  05/06/2024  -     Sedimentation rate; Future; Expected date: 05/06/2024  -     C-Reactive Protein; Future; Expected date: 05/06/2024  -     CBC Auto Differential; Future; Expected date: 05/06/2024  -     Comprehensive Metabolic Panel; Future; Expected date: 05/06/2024    3. Arteritis, unspecified  -     Cancel: CTA Head and Neck (xpd); Future; Expected date: 05/06/2024  -     CTA Head; Future; Expected date: 05/06/2024  -     Sedimentation rate; Future; Expected date: 05/06/2024  -     C-Reactive Protein; Future; Expected date: 05/06/2024  -     CBC Auto Differential; Future; Expected date: 05/06/2024  -     Comprehensive Metabolic Panel; Future; Expected date: 05/06/2024    4. Other symptoms and signs involving the nervous system  -     Cancel: CTA Head and Neck (xpd); Future; Expected date: 05/06/2024  -     CTA Head; Future; Expected date: 05/06/2024  -     Sedimentation rate; Future; Expected date: 05/06/2024  -     C-Reactive Protein; Future; Expected date: 05/06/2024  -     CBC Auto Differential; Future; Expected date: 05/06/2024  -     Comprehensive Metabolic Panel; Future; Expected date: 05/06/2024    Other orders  -     predniSONE (DELTASONE) 10 MG tablet; Take 4 tablets (40 mg total) by mouth once daily for 1 day, THEN 3 tablets (30 mg total) once daily for 2 days, THEN 2 tablets (20 mg total) once daily for 3 days, THEN 1 tablet (10 mg total) once daily for 4 days, THEN 0.5 tablets (5 mg total) once daily for 5 days.  Dispense: 23 tablet; Refill: 0    Concern for temporal arteritis, inflammatory markers ordered, rule out subacute or chronic CVA at this point symptoms have been present for the past 4 weeks.  We are going to place her on a prednisone taper we may or may not augment therapy depending on the results of the lab studies.  Referral to sae an ENT placed.  She is going for stat CTA with labs at 1 of our imaging facilities    Follow up if symptoms worsen or fail to improve. In addition to their  scheduled follow up, the patient has also been instructed to follow up on as needed basis.     Future Appointments   Date Time Provider Department Center   5/6/2024  3:30 PM BS CT1 500 LB LIMIT [facility]        Sloan Rai MD

## 2024-05-06 NOTE — TELEPHONE ENCOUNTER
----- Message from Sloan Rai MD sent at 5/6/2024  4:42 PM CDT -----  CTA of the head reviewed evidence of mild chronic vascular disease which is very much age-appropriate but no additional abnormality of the brain is identified.  The temporal arteries appear to be normal without any evidence of irregularity or wall thickening or occlusion which is what we were looking for.  Tell her to hold off on the prednisone until we get the inflammatory markers back she needs to call and make a prompt appointment with Dr. Arguello about her vision changes in her left eye  Further recommendations to follow shortly

## 2024-05-08 ENCOUNTER — TELEPHONE (OUTPATIENT)
Dept: INTERNAL MEDICINE | Facility: CLINIC | Age: 89
End: 2024-05-08
Payer: MEDICARE

## 2024-05-09 ENCOUNTER — TELEPHONE (OUTPATIENT)
Dept: INTERNAL MEDICINE | Facility: CLINIC | Age: 89
End: 2024-05-09
Payer: MEDICARE

## 2024-05-09 NOTE — TELEPHONE ENCOUNTER
----- Message from Stephanie Villeda sent at 5/9/2024 10:34 AM CDT -----  Regarding: referral  Type:  Needs Medical Advice    Who Called: mendoza @ dr tashi lopez    Best Call Back Number: [phone]     fax# [phone]    Additional Information: latest test results faxed over did not receive with referral    Pt is at office right now

## 2024-05-09 NOTE — TELEPHONE ENCOUNTER
----- Message from [facility] sent at 5/9/2024  3:00 PM CDT -----  .Type:  Needs Medical Advice    Who Called:  pt    Symptoms (please be specific):  no     How long has patient had these symptoms:   no    Pharmacy name and phone #:   no    Would the patient rather a call back or a response via [facility]?  Cb    Best Call Back Number:  [phone]    Additional Information: pt states she [facility] and [facility]  or call  [phone] please advise thanks

## 2024-05-28 ENCOUNTER — TELEPHONE (OUTPATIENT)
Dept: INTERNAL MEDICINE | Facility: CLINIC | Age: 89
End: 2024-05-28
Payer: MEDICARE

## 2024-05-28 NOTE — TELEPHONE ENCOUNTER
----- Message from Jazmyne Duarte sent at 5/28/2024  2:59 PM CDT -----  Who Called: Mary Z Melancon    Caller is requesting assistance/information from provider's office      Preferred Method of Contact: Phone Call  Patient's Preferred Phone Number on File: [phone]   Best Call Back Number, if different:[phone] (carson-son) ##  Additional Information: medical advice needed,  pt son called and stated [employer] needs approval for medication ( predniSONE (DELTASONE) 10 MG tablet 23 tablet), also pt son is inquiring when is pt next appt, please advise, thanks

## 2024-05-28 NOTE — TELEPHONE ENCOUNTER
Reilly, Maddie Lisa Staff  Caller: Unspecified (Today,  4:35 PM)  .Who Called: Mary Pedraza    Patient is returning phone call    Who Left Message for Patient:Juan  Does the patient know what this is regarding?:unk      Preferred Method of Contact: Phone Call  Patient's Preferred Phone Number on File: [phone]  Best Call Back Number, if different:[phone]  Additional Information: miss call

## 2024-05-28 NOTE — TELEPHONE ENCOUNTER
Spoke to pt's son and he wanted to know if pt is still needing or not needing the prednisone taper?    And they would like to know when you would like to see pt next?

## 2024-05-28 NOTE — TELEPHONE ENCOUNTER
Spoke to pt's son who stated that Dr. Arguello stated that it is Macular Degeneration, and set up an appt to Dr. Cardoza on 6/25/24. Pt's son stated they were told that it is not urgent they see Dr. Trejo sooner. Records requested

## 2024-05-29 ENCOUNTER — TELEPHONE (OUTPATIENT)
Dept: INTERNAL MEDICINE | Facility: CLINIC | Age: 89
End: 2024-05-29
Payer: MEDICARE

## 2024-07-19 ENCOUNTER — TELEPHONE (OUTPATIENT)
Dept: INTERNAL MEDICINE | Facility: CLINIC | Age: 89
End: 2024-07-19
Payer: MEDICARE

## 2024-07-19 NOTE — TELEPHONE ENCOUNTER
----- Message from Juan Vargas MA sent at 7/18/2024  4:06 PM CDT -----  Please schedule pt in the next 1-2 weeks  ----- Message -----  From: Williams Norris  Sent: 7/18/2024   3:20 PM CDT  To: Boom Lisa Staff    .Type:  Needs Medical Advice    Who Called: Jv  Symptoms (please be specific):    How long has patient had these symptoms:    Pharmacy name and phone #:    Would the patient rather a call back or a response via [facility]?   Best Call Back Number: [phone]  Additional Information: Patient's son called to speak with the nurse about when her mother should come back in to see the doctor.

## 2024-09-04 ENCOUNTER — APPOINTMENT (OUTPATIENT)
Dept: LAB | Facility: HOSPITAL | Age: 89
End: 2024-09-04
Attending: INTERNAL MEDICINE
Payer: MEDICARE

## 2024-09-04 DIAGNOSIS — I65.23 BILATERAL CAROTID ARTERY OCCLUSION: ICD-10-CM

## 2024-09-04 DIAGNOSIS — I10 ESSENTIAL HYPERTENSION, MALIGNANT: ICD-10-CM

## 2024-09-04 DIAGNOSIS — R06.02 SHORTNESS OF BREATH: ICD-10-CM

## 2024-09-04 DIAGNOSIS — D64.9 ANEMIA, UNSPECIFIED TYPE: Primary | ICD-10-CM

## 2024-09-04 LAB
ALBUMIN SERPL-MCNC: 3.8 G/DL (ref 3.4–4.8)
ALBUMIN/GLOB SERPL: 1.2 RATIO (ref 1.1–2)
ALP SERPL-CCNC: 65 UNIT/L (ref 40–150)
ALT SERPL-CCNC: 15 UNIT/L (ref 0–55)
ANION GAP SERPL CALC-SCNC: 6 MEQ/L
AST SERPL-CCNC: 22 UNIT/L (ref 5–34)
BILIRUB SERPL-MCNC: 0.6 MG/DL
BUN SERPL-MCNC: 19.3 MG/DL (ref 9.8–20.1)
CALCIUM SERPL-MCNC: 10 MG/DL (ref 8.4–10.2)
CHLORIDE SERPL-SCNC: 101 MMOL/L (ref 98–107)
CHOLEST SERPL-MCNC: 212 MG/DL
CHOLEST/HDLC SERPL: 3 {RATIO} (ref 0–5)
CO2 SERPL-SCNC: 30 MMOL/L (ref 23–31)
CREAT SERPL-MCNC: 0.87 MG/DL (ref 0.55–1.02)
CREAT/UREA NIT SERPL: 22
ERYTHROCYTE [DISTWIDTH] IN BLOOD BY AUTOMATED COUNT: 13.1 % (ref 11.5–17)
GFR SERPLBLD CREATININE-BSD FMLA CKD-EPI: >60 ML/MIN/1.73/M2
GLOBULIN SER-MCNC: 3.1 GM/DL (ref 2.4–3.5)
GLUCOSE SERPL-MCNC: 114 MG/DL (ref 82–115)
HCT VFR BLD AUTO: 38.6 % (ref 37–47)
HDLC SERPL-MCNC: 71 MG/DL (ref 35–60)
HGB BLD-MCNC: 12.7 G/DL (ref 12–16)
LDLC SERPL CALC-MCNC: 120 MG/DL (ref 50–140)
MCH RBC QN AUTO: 32.9 PG (ref 27–31)
MCHC RBC AUTO-ENTMCNC: 32.9 G/DL (ref 33–36)
MCV RBC AUTO: 100 FL (ref 80–94)
NRBC BLD AUTO-RTO: 0 %
PLATELET # BLD AUTO: 199 X10(3)/MCL (ref 130–400)
PMV BLD AUTO: 10.4 FL (ref 7.4–10.4)
POTASSIUM SERPL-SCNC: 4.9 MMOL/L (ref 3.5–5.1)
PROT SERPL-MCNC: 6.9 GM/DL (ref 5.8–7.6)
RBC # BLD AUTO: 3.86 X10(6)/MCL (ref 4.2–5.4)
SODIUM SERPL-SCNC: 137 MMOL/L (ref 136–145)
TRIGL SERPL-MCNC: 107 MG/DL (ref 37–140)
VLDLC SERPL CALC-MCNC: 21 MG/DL
WBC # BLD AUTO: 5.04 X10(3)/MCL (ref 4.5–11.5)

## 2024-09-04 PROCEDURE — 80053 COMPREHEN METABOLIC PANEL: CPT

## 2024-09-04 PROCEDURE — 85027 COMPLETE CBC AUTOMATED: CPT

## 2024-09-04 PROCEDURE — 80061 LIPID PANEL: CPT

## 2024-09-04 PROCEDURE — 36415 COLL VENOUS BLD VENIPUNCTURE: CPT

## 2024-10-21 ENCOUNTER — OFFICE VISIT (OUTPATIENT)
Dept: INTERNAL MEDICINE | Facility: CLINIC | Age: 89
End: 2024-10-21
Payer: MEDICARE

## 2024-10-21 VITALS
OXYGEN SATURATION: 96 % | TEMPERATURE: 97 F | DIASTOLIC BLOOD PRESSURE: 76 MMHG | BODY MASS INDEX: 15.22 KG/M2 | RESPIRATION RATE: 16 BRPM | HEIGHT: 67 IN | WEIGHT: 97 LBS | SYSTOLIC BLOOD PRESSURE: 124 MMHG | HEART RATE: 83 BPM

## 2024-10-21 DIAGNOSIS — Z85.528 HISTORY OF RENAL CELL CANCER: ICD-10-CM

## 2024-10-21 DIAGNOSIS — I35.0 NONRHEUMATIC AORTIC VALVE STENOSIS: ICD-10-CM

## 2024-10-21 DIAGNOSIS — I10 PRIMARY HYPERTENSION: ICD-10-CM

## 2024-10-21 DIAGNOSIS — C64.9 MALIGNANT NEOPLASM OF KIDNEY, UNSPECIFIED LATERALITY: ICD-10-CM

## 2024-10-21 DIAGNOSIS — Z00.00 MEDICARE ANNUAL WELLNESS VISIT, SUBSEQUENT: Primary | ICD-10-CM

## 2024-10-21 DIAGNOSIS — Z23 NEED FOR VACCINATION: ICD-10-CM

## 2024-10-21 PROBLEM — R29.818 OTHER SYMPTOMS AND SIGNS INVOLVING THE NERVOUS SYSTEM: Status: RESOLVED | Noted: 2024-05-06 | Resolved: 2024-10-21

## 2024-10-21 PROBLEM — K52.9 COLITIS: Status: RESOLVED | Noted: 2022-09-29 | Resolved: 2024-10-21

## 2024-10-21 PROBLEM — H53.9 VISION CHANGES: Status: RESOLVED | Noted: 2024-05-06 | Resolved: 2024-10-21

## 2024-10-21 PROBLEM — R63.4 WEIGHT LOSS: Status: RESOLVED | Noted: 2024-01-23 | Resolved: 2024-10-21

## 2024-10-21 PROBLEM — F41.9 ANXIETY DISORDER, UNSPECIFIED: Status: RESOLVED | Noted: 2023-10-18 | Resolved: 2024-10-21

## 2024-10-21 PROCEDURE — 90653 IIV ADJUVANT VACCINE IM: CPT | Mod: ,,, | Performed by: INTERNAL MEDICINE

## 2024-10-21 PROCEDURE — 1160F RVW MEDS BY RX/DR IN RCRD: CPT | Mod: CPTII,,, | Performed by: INTERNAL MEDICINE

## 2024-10-21 PROCEDURE — 1159F MED LIST DOCD IN RCRD: CPT | Mod: CPTII,,, | Performed by: INTERNAL MEDICINE

## 2024-10-21 PROCEDURE — G0439 PPPS, SUBSEQ VISIT: HCPCS | Mod: ,,, | Performed by: INTERNAL MEDICINE

## 2024-10-21 PROCEDURE — G0008 ADMIN INFLUENZA VIRUS VAC: HCPCS | Mod: ,,, | Performed by: INTERNAL MEDICINE

## 2024-10-21 PROCEDURE — 1158F ADVNC CARE PLAN TLK DOCD: CPT | Mod: CPTII,,, | Performed by: INTERNAL MEDICINE

## 2024-10-21 PROCEDURE — 1101F PT FALLS ASSESS-DOCD LE1/YR: CPT | Mod: CPTII,,, | Performed by: INTERNAL MEDICINE

## 2024-10-21 PROCEDURE — 3288F FALL RISK ASSESSMENT DOCD: CPT | Mod: CPTII,,, | Performed by: INTERNAL MEDICINE

## 2024-10-21 PROCEDURE — 1126F AMNT PAIN NOTED NONE PRSNT: CPT | Mod: CPTII,,, | Performed by: INTERNAL MEDICINE

## 2024-10-21 RX ORDER — AMITRIPTYLINE HYDROCHLORIDE 10 MG/1
10 TABLET, FILM COATED ORAL NIGHTLY
Qty: 90 TABLET | Refills: 3 | Status: SHIPPED | OUTPATIENT
Start: 2024-10-21

## 2024-10-21 RX ORDER — CETIRIZINE HYDROCHLORIDE 10 MG/1
10 TABLET ORAL NIGHTLY
COMMUNITY
Start: 2024-06-03 | End: 2024-10-21 | Stop reason: SDUPTHER

## 2024-10-21 RX ORDER — CETIRIZINE HYDROCHLORIDE 10 MG/1
10 TABLET ORAL NIGHTLY
Qty: 90 TABLET | Refills: 3 | Status: SHIPPED | OUTPATIENT
Start: 2024-10-21

## 2024-10-21 NOTE — ASSESSMENT & PLAN NOTE
Scheduled for a CT this year with her urologist.  I have some concerns about her ongoing weight loss but patient is not concerned her weight in October of last year was 108 in May she was 105 today she is 97 lb

## 2024-10-21 NOTE — PROGRESS NOTES
Internal Medicine      Patient ID: [id number]     Chief Complaint: Medicare Annual Wellness     HPI:     Mary Pedraza is a 89 y.o. female here today for a Medicare Annual Wellness visit and comprehensive Health Risk Assessment.   Past medical history of breast cancer in 1999. Right mastectomy.  Last left mammogram in January 2019.  Patient no longer wants to do mammograms.  Had 9 siblings at one time; now only 2 are living  Dr. Hernandez-cardiology.  Abnormal PET in July 2018, coronary angiogram done at that time which showed clean coronaries.  Aortic stenosis; moderate/ severe. Has some Dyspnea, no syncope, has some pre- syncope  Dr. 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  Last colonoscopy in January 2019. Off mesalamine  Dr. Yeboah-urology history of renal cell carcinoma status post partial nephrectomy. Goes to see him once a year with scans.   Patient with history of osteopenia, no longer wants bone densities  Had kyphoplasty recently  On Amitiza for constipation  I have some concerns about her ongoing weight loss but patient is not concerned her weight in October of last year was 108 in May she was 105 today she is 97 lb   Labs are excellent  Flu shot today      Health Maintenance         Date Due Completion Date    TETANUS VACCINE Never done ---    Shingles Vaccine (1 of 2) Never done ---    Influenza Vaccine (1) 09/01/2024 10/18/2023    COVID-19 Vaccine (7 - 2024-25 season) 09/01/2024 11/16/2023    Hemoglobin A1c (Prediabetes) 01/18/2025 1/18/2024    Lipid Panel 09/04/2029 9/4/2024             Past Medical History:   Diagnosis Date    Anemia, unspecified     Anxiety disorder, unspecified     Bilateral cataracts     Bruises easily     Gastro-esophageal reflux disease without esophagitis     Generalized arthritis     History of breast cancer 10/18/2023    History of renal cell cancer 10/18/2023    HTN (hypertension)     Impaired fasting blood sugar 10/18/2023    Other specified  noninfective gastroenteritis and colitis     Panic attacks     Personal history of colonic polyps         History reviewed. No pertinent surgical history.     Social History     Socioeconomic History    Marital status:    Tobacco Use    Smoking status: Never    Smokeless tobacco: Never   Substance and Sexual Activity    Alcohol use: Not Currently    Drug use: Never    Sexual activity: Not Currently     Social Drivers of Health     Financial Resource Strain: Low Risk  (10/18/2023)    Overall Financial Resource Strain (CARDIA)     Difficulty of Paying Living Expenses: Not hard at all   Food Insecurity: No Food Insecurity (10/18/2023)    Hunger Vital Sign     Worried About Running Out of Food in the Last Year: Never true     Ran Out of Food in the Last Year: Never true   Transportation Needs: No Transportation Needs (10/18/2023)    PRAPARE - Transportation     Lack of Transportation (Medical): No     Lack of Transportation (Non-Medical): No   Physical Activity: Insufficiently Active (10/18/2023)    Exercise Vital Sign     Days of Exercise per Week: 3 days     Minutes of Exercise per Session: 20 min   Stress: No Stress Concern Present (10/18/2023)    [facility] - Occupational Stress Questionnaire     Feeling of Stress : Only a little   Housing Stability: Low Risk  (1/23/2024)    Housing Stability Vital Sign     Unable to Pay for Housing in the Last Year: No     Number of Places Lived in the Last Year: 1     Unstable Housing in the Last Year: No        No family history on file.     Current Outpatient Medications   Medication Instructions    amitriptyline (ELAVIL) 10 mg, Oral, Nightly    aspirin (ECOTRIN) 81 mg, Daily    biotin 5,000 mg, Daily    calcium carbonate-vitamin D3 1,000 mg-20 mcg (800 unit) Tab 800 mg, Daily    cetirizine (ZYRTEC) 10 mg, Oral, Nightly    docusate sodium (COLACE) 100 mg, Nightly    lubiprostone (AMITIZA) 8 mcg, Oral, 2 times daily PRN    metoprolol succinate  (TOPROL-XL) 12.5 mg, Oral, Daily    mv-mn/om3/dha/epa/fish/lut/arcadio (OCUVITE ADULT 50 PLUS ORAL) 1 capsule, Daily    pantoprazole (PROTONIX) 40 mg, Every morning       Review of patient's allergies indicates:   Allergen Reactions    Adhesive tape-silicones Hives    Neomycin-bacitracin-polymyxin      Other reaction(s): makes area worse    Oxycodone      Other reaction(s): hoarseness  restless, rash    Dexamethasone Rash     pain in jaw, shoulder, ears, and rash        Immunization History   Administered Date(s) Administered    COVID-19 MRNA, LN-S PF (MODERNA HALF 0.25 ML DOSE) 07/05/2022    COVID-19, MRNA, LN-S, PF (MODERNA FULL 0.5 ML DOSE) 02/10/2021, 03/10/2021, 10/28/2021    COVID-19, mRNA, LNP-S, PF (Moderna) Ages 12+ 11/16/2023    COVID-19, mRNA, LNP-S, bivalent booster, PF (Moderna Omicron)12 + YEARS 10/27/2022    Influenza (FLUAD) - Quadrivalent - Adjuvanted - PF *Preferred* (65+) 09/29/2022, 10/18/2023    Influenza - Quadrivalent - High Dose - PF (65 years and older) 10/06/2020    Influenza - Quadrivalent - PF *Preferred* (6 months and older) 09/29/2009    Influenza - Trivalent - Fluarix, Flulaval, Fluzone, Afluria - PF 09/29/2009, 10/09/2014, 09/15/2016, 09/27/2017, 10/12/2018, 09/29/2021    Influenza - Trivalent - Fluzone High Dose - PF (65 years and older) 10/27/2015, 10/29/2019    Pneumococcal Conjugate - 13 Valent 08/24/2016    Pneumococcal Polysaccharide - 23 Valent 03/26/2018    RSVpreF (Arexvy) 10/30/2023        Patient Care Team:  Sloan Rai MD as PCP - General (Internal Medicine)  Laura Escalante MD as Consulting Physician (Cardiology)  Juliano Yeboah MD as Consulting Physician (Urology)    Subjective:     Review of Systems    12 point review of systems conducted, negative except as stated in the history of present illness. See HPI for details.    Objective:     Visit Vitals  /76 (BP Location: Left arm, Patient Position: Sitting)   Pulse 83   Temp 96.9 °F (36.1 °C) (Temporal)  "  Resp 16   Ht 5' 7" (1.702 m)   Wt 44 kg (97 lb)   SpO2 96%   BMI 15.19 kg/m²       Physical Exam  Constitutional:       Appearance: Normal appearance.      Comments: Thin  female   HENT:      Head: Normocephalic and atraumatic.   Eyes:      Extraocular Movements: Extraocular movements intact.      Pupils: Pupils are equal, round, and reactive to light.   Cardiovascular:      Rate and Rhythm: Normal rate and regular rhythm.   Pulmonary:      Effort: Pulmonary effort is normal.      Breath sounds: Normal breath sounds.   Skin:     General: Skin is warm and dry.   Neurological:      General: No focal deficit present.      Mental Status: She is alert.   Psychiatric:         Mood and Affect: Mood normal.           Labs Reviewed:     Chemistry:  Lab Results   Component Value Date     09/04/2024    K 4.9 09/04/2024    BUN 19.3 09/04/2024    CREATININE 0.87 09/04/2024    EGFRNORACEVR >60 09/04/2024    GLUCOSE 114 09/04/2024    CALCIUM 10.0 09/04/2024    ALKPHOS 65 09/04/2024    LABPROT 6.9 09/04/2024    ALBUMIN 3.8 09/04/2024    BILIDIR 0.10 03/21/2019    IBILI 0.30 03/21/2019    AST 22 09/04/2024    ALT 15 09/04/2024    DBBRCIFL46ZG 51.3 09/25/2023    TSH 2.5267 09/23/2022        Lab Results   Component Value Date    HGBA1C 5.6 01/18/2024        Hematology:  Lab Results   Component Value Date    WBC 5.04 09/04/2024    HGB 12.7 09/04/2024    HCT 38.6 09/04/2024     09/04/2024       Lipid Panel:  Lab Results   Component Value Date    CHOL 212 (H) 09/04/2024    HDL 71 (H) 09/04/2024    .00 09/04/2024    TRIG 107 09/04/2024    TOTALCHOLEST 3 09/04/2024        Urine:  Lab Results   Component Value Date    APPEARANCEUA Clear 09/25/2023    SGUA 1.010 09/25/2023    PROTEINUA Negative 09/25/2023    KETONESUA Negative 09/25/2023    LEUKOCYTESUR Trace (A) 09/25/2023    RBCUA 0-5 09/25/2023    WBCUA <5 09/25/2023    BACTERIA None Seen 09/25/2023        Assessment:       ICD-10-CM ICD-9-CM   1. Medicare " annual wellness visit, subsequent  Z00.00 V70.0   2. Malignant neoplasm of kidney, unspecified laterality  C64.9 189.0   3. History of renal cell cancer  Z85.528 V10.52   4. Primary hypertension  I10 401.9   5. Nonrheumatic aortic valve stenosis  I35.0 424.1        Plan:     1. Medicare annual wellness visit, subsequent  Overview:    Cervical Cancer Screening - No longer indicated.   Breast Cancer Screening - No longer indicated. Declines screening.  Colon Cancer Screening - No longer indicated.  Osteoporosis Screening - Declines.  Eye Exam - Recommend annually.  Dental Exam - Recommend biannually  Vaccinations -   Immunization History   Administered Date(s) Administered    COVID-19, MRNA, LN-S, PF (MODERNA FULL 0.5 ML DOSE) 02/10/2021, 03/10/2021, 10/28/2021, 07/05/2022    COVID-19, mRNA, LNP-S, bivalent booster, PF (Moderna Omicron)12 + YEARS 10/27/2022    Influenza (FLUAD) - Quadrivalent - Adjuvanted - PF *Preferred* (65+) 09/29/2022, 10/18/2023    Influenza - High Dose - PF (65 years and older) 10/27/2015, 10/29/2019    Influenza - Quadrivalent - High Dose - PF (65 years and older) 10/06/2020    Influenza - Quadrivalent - PF *Preferred* (6 months and older) 09/29/2009    Influenza - Trivalent - PF (ADULT) 09/29/2009, 10/09/2014, 09/15/2016, 09/27/2017, 10/12/2018, 09/29/2021    Pneumococcal Conjugate - 13 Valent 08/24/2016    Pneumococcal Polysaccharide - 23 Valent 03/26/2018          Assessment & Plan:  Flu vaccine today  Remainder of age-appropriate exams are up-to-date        2. Malignant neoplasm of kidney, unspecified laterality  -     amitriptyline (ELAVIL) 10 MG tablet; Take 1 tablet (10 mg total) by mouth every evening.  Dispense: 90 tablet; Refill: 3    3. History of renal cell cancer  Overview:  S/p partial nephrectomy   Followed by Dr. Yeboah annually    Assessment & Plan:    Scheduled for a CT this year with her urologist.  I have some concerns about her ongoing weight loss but patient is not  concerned her weight in October of last year was 108 in May she was 105 today she is 97 lb      4. Primary hypertension  Assessment & Plan:      Plan  - BP is controlled, no changes needed to their regimen  -       5. Nonrheumatic aortic valve stenosis  Assessment & Plan:  Echocardiogram with evidence of aortic stenosis that is moderate .  Asymptomatic followed by Cardiology      Other orders  -     cetirizine (ZYRTEC) 10 MG tablet; Take 1 tablet (10 mg total) by mouth every evening.  Dispense: 90 tablet; Refill: 3         The following assessments were completed and reviewed. See completed screening forms and assessments within the Encounter Summary.   [x] Health Risk Assessment   [x] CVD Risk Factors   [x] Obesity/Physical Activity -  Encouraged daily 30 minute physical activity x 5 days per week.   [x] Home Safety/Living Situation   [x] Alcohol Screen  [x] Depression (PHQ) Screen   [x] Timed Get Up and Go   [x] Whisper Test  [x] Cognitive Function/Impairment Screen   [x] Nutrition Screening  [x] ADL Screen   [x] Opioid Screen:  [x] Patient does not have a prescription for opioids.   [] Patient has a prescription for opioids but is at low risk for abuse.   [x] Substance Abuse Screen:   [x] Patient does not use illicit substances.   [] Patient screens positive for substance use disorder.   Advance Care Planning   Advance Care Planning     Date: 10/21/2024    Living Will  During this visit, I engaged the patient and family  in the voluntary advance care planning process.  The patient and I reviewed the role for advance directives and their purpose in directing future healthcare if the patient's unable to speak for him/herself.  At this point in time, the patient does have full decision-making capacity.  We discussed different extreme health states that she could experience, and reviewed what kind of medical care she would want in those situations. She will provide us a copy of her living will and POA             I  attest to discussing Advance Care Planning with patient and/or family member.  Education was provided including the importance of the Health Care Power of , Advance Directives, and/or [place] documentation.  The patient expressed understanding to the importance of this information and discussion.       Provided patient with a 5-10 year written screening schedule and personal prevention plan. Recommendations were developed using the USPSTF age appropriate recommendations. Education, counseling, and referrals were provided as needed. After Visit Summary printed and given to patient, which includes a list of additional screenings\tests needed.    Follow up in about 6 months (around 4/21/2025) for with labs prior to visit. In addition to their scheduled follow up, the patient has also been instructed to follow up on as needed basis.     No future appointments.     Sloan Rai MD

## 2024-10-21 NOTE — ASSESSMENT & PLAN NOTE
Echocardiogram with evidence of aortic stenosis that is moderate .  Asymptomatic followed by Cardiology

## 2024-11-19 DIAGNOSIS — K59.00 CONSTIPATION, UNSPECIFIED CONSTIPATION TYPE: ICD-10-CM

## 2024-11-19 RX ORDER — LUBIPROSTONE 8 UG/1
8 CAPSULE ORAL 2 TIMES DAILY
Qty: 180 CAPSULE | Refills: 3 | Status: SHIPPED | OUTPATIENT
Start: 2024-11-19

## 2025-04-17 ENCOUNTER — TELEPHONE (OUTPATIENT)
Dept: INTERNAL MEDICINE | Facility: CLINIC | Age: OVER 89
End: 2025-04-17
Payer: MEDICARE

## 2025-04-17 DIAGNOSIS — I70.0 AORTIC ATHEROSCLEROSIS: ICD-10-CM

## 2025-04-17 DIAGNOSIS — I10 PRIMARY HYPERTENSION: Primary | ICD-10-CM

## 2025-04-17 NOTE — TELEPHONE ENCOUNTER
Copied from CRM #[id number]. Topic: Appointments - Amb Referral  >> Apr 17, 2025 10:48 AM Halley wrote:  .Caller is requesting to schedule their Lab appointment prior to annual appointment.  Order is not listed in EPIC.  Please enter order and contact patient to schedule.    Name of Caller:Jv    Preferred Date and Time of Labs:Before appt    Date of [facility] Appointment:04/30/2025    Where would they like the lab performed?[facility]    Would the patient rather a call back or a response via [facility]? ALEENA    Best Call Back Number:[phone]    Additional Information:Please put orders in system

## 2025-04-23 ENCOUNTER — RESULTS FOLLOW-UP (OUTPATIENT)
Dept: INTERNAL MEDICINE | Facility: CLINIC | Age: OVER 89
End: 2025-04-23

## 2025-04-23 ENCOUNTER — LAB VISIT (OUTPATIENT)
Dept: LAB | Facility: HOSPITAL | Age: OVER 89
End: 2025-04-23
Attending: INTERNAL MEDICINE
Payer: MEDICARE

## 2025-04-23 DIAGNOSIS — I70.0 AORTIC ATHEROSCLEROSIS: ICD-10-CM

## 2025-04-23 DIAGNOSIS — I10 PRIMARY HYPERTENSION: ICD-10-CM

## 2025-04-23 LAB
ALBUMIN SERPL-MCNC: 3.8 G/DL (ref 3.4–4.8)
ALBUMIN/GLOB SERPL: 1 RATIO (ref 1.1–2)
ALP SERPL-CCNC: 74 UNIT/L (ref 40–150)
ALT SERPL-CCNC: 12 UNIT/L (ref 0–55)
ANION GAP SERPL CALC-SCNC: 8 MEQ/L
AST SERPL-CCNC: 21 UNIT/L (ref 11–45)
BILIRUB SERPL-MCNC: 0.4 MG/DL
BUN SERPL-MCNC: 31.3 MG/DL (ref 9.8–20.1)
CALCIUM SERPL-MCNC: 9.7 MG/DL (ref 8.4–10.2)
CHLORIDE SERPL-SCNC: 100 MMOL/L (ref 98–107)
CHOLEST SERPL-MCNC: 223 MG/DL
CHOLEST/HDLC SERPL: 3 {RATIO} (ref 0–5)
CO2 SERPL-SCNC: 29 MMOL/L (ref 23–31)
CREAT SERPL-MCNC: 0.94 MG/DL (ref 0.55–1.02)
CREAT/UREA NIT SERPL: 33
GFR SERPLBLD CREATININE-BSD FMLA CKD-EPI: 58 ML/MIN/1.73/M2
GLOBULIN SER-MCNC: 3.9 GM/DL (ref 2.4–3.5)
GLUCOSE SERPL-MCNC: 105 MG/DL (ref 82–115)
HDLC SERPL-MCNC: 78 MG/DL (ref 35–60)
LDLC SERPL CALC-MCNC: 129 MG/DL (ref 50–140)
POTASSIUM SERPL-SCNC: 4.5 MMOL/L (ref 3.5–5.1)
PROT SERPL-MCNC: 7.7 GM/DL (ref 5.8–7.6)
SODIUM SERPL-SCNC: 137 MMOL/L (ref 136–145)
TRIGL SERPL-MCNC: 81 MG/DL (ref 37–140)
VLDLC SERPL CALC-MCNC: 16 MG/DL

## 2025-04-23 PROCEDURE — 80061 LIPID PANEL: CPT

## 2025-04-23 PROCEDURE — 80053 COMPREHEN METABOLIC PANEL: CPT

## 2025-04-23 PROCEDURE — 36415 COLL VENOUS BLD VENIPUNCTURE: CPT

## 2025-04-30 ENCOUNTER — OFFICE VISIT (OUTPATIENT)
Dept: INTERNAL MEDICINE | Facility: CLINIC | Age: OVER 89
End: 2025-04-30
Payer: MEDICARE

## 2025-04-30 VITALS
HEART RATE: 73 BPM | OXYGEN SATURATION: 97 % | RESPIRATION RATE: 16 BRPM | BODY MASS INDEX: 15.35 KG/M2 | WEIGHT: 97.81 LBS | HEIGHT: 67 IN | TEMPERATURE: 97 F

## 2025-04-30 DIAGNOSIS — E55.9 VITAMIN D DEFICIENCY, UNSPECIFIED: ICD-10-CM

## 2025-04-30 DIAGNOSIS — K59.00 CONSTIPATION, UNSPECIFIED CONSTIPATION TYPE: ICD-10-CM

## 2025-04-30 DIAGNOSIS — R73.01 IMPAIRED FASTING BLOOD SUGAR: ICD-10-CM

## 2025-04-30 DIAGNOSIS — R63.4 WEIGHT LOSS: ICD-10-CM

## 2025-04-30 DIAGNOSIS — I70.0 AORTIC ATHEROSCLEROSIS: ICD-10-CM

## 2025-04-30 DIAGNOSIS — Z85.3 HISTORY OF BREAST CANCER: ICD-10-CM

## 2025-04-30 DIAGNOSIS — M31.6 TEMPORAL ARTERITIS SYNDROME: ICD-10-CM

## 2025-04-30 DIAGNOSIS — Z85.528 HISTORY OF RENAL CELL CANCER: ICD-10-CM

## 2025-04-30 DIAGNOSIS — I10 PRIMARY HYPERTENSION: Primary | ICD-10-CM

## 2025-04-30 PROCEDURE — 1159F MED LIST DOCD IN RCRD: CPT | Mod: CPTII,,, | Performed by: NURSE PRACTITIONER

## 2025-04-30 PROCEDURE — 1160F RVW MEDS BY RX/DR IN RCRD: CPT | Mod: CPTII,,, | Performed by: NURSE PRACTITIONER

## 2025-04-30 PROCEDURE — 1101F PT FALLS ASSESS-DOCD LE1/YR: CPT | Mod: CPTII,,, | Performed by: NURSE PRACTITIONER

## 2025-04-30 PROCEDURE — 99214 OFFICE O/P EST MOD 30 MIN: CPT | Mod: ,,, | Performed by: NURSE PRACTITIONER

## 2025-04-30 PROCEDURE — 1126F AMNT PAIN NOTED NONE PRSNT: CPT | Mod: CPTII,,, | Performed by: NURSE PRACTITIONER

## 2025-04-30 PROCEDURE — 3288F FALL RISK ASSESSMENT DOCD: CPT | Mod: CPTII,,, | Performed by: NURSE PRACTITIONER

## 2025-04-30 NOTE — ASSESSMENT & PLAN NOTE
Well controlled.   Hypertension Medications              metoprolol succinate (TOPROL-XL) 25 MG 24 hr tablet Take 12.5 mg by mouth once daily.     Low Sodium Diet (DASH Diet - Less than 2 grams of sodium per day).  Monitor blood pressure daily and log. Report consistent numbers greater than 140/90.  Maintain healthy weight with goal BMI <30. Exercise 30 minutes per day, 5 days per week.

## 2025-04-30 NOTE — PROGRESS NOTES
Internal Medicine    Patient ID: [id number]     Chief Complaint: Hyperlipidemia    HPI:     Mary Pedraza is a 89 y.o. female here today for a follow up. Reviewed and discussed lab results. Accompanied by son today.     Having some muscles spasms that come and go. Drinks about 2-3 bottles of water per day. BUN a little elevated.    Weight loss has stabilized.     No other complaints today.     Past Medical History:   Diagnosis Date    Anemia, unspecified     Anxiety disorder, unspecified     Bilateral cataracts     Bruises easily     Gastro-esophageal reflux disease without esophagitis     Generalized arthritis     History of breast cancer 10/18/2023    History of renal cell cancer 10/18/2023    HTN (hypertension)     Impaired fasting blood sugar 10/18/2023    Other specified noninfective gastroenteritis and colitis     Panic attacks     Personal history of colonic polyps         History reviewed. No pertinent surgical history.     Social History     Tobacco Use    Smoking status: Never    Smokeless tobacco: Never   Substance and Sexual Activity    Alcohol use: Not Currently    Drug use: Never    Sexual activity: Not Currently        Current Outpatient Medications   Medication Instructions    amitriptyline (ELAVIL) 10 mg, Oral, Nightly    aspirin (ECOTRIN) 81 mg, Daily    biotin 5,000 mg, Daily    calcium carbonate-vitamin D3 1,000 mg-20 mcg (800 unit) Tab 800 mg, Daily    cetirizine (ZYRTEC) 10 mg, Oral, Daily PRN    docusate sodium (COLACE) 100 mg, Nightly    lubiprostone (AMITIZA) 8 mcg, Oral, 2 times daily    metoprolol succinate (TOPROL-XL) 12.5 mg, Daily    mv-mn/om3/dha/epa/fish/lut/arcadio (OCUVITE ADULT 50 PLUS ORAL) 1 capsule, Daily    pantoprazole (PROTONIX) 40 mg, Every morning       Review of patient's allergies indicates:   Allergen Reactions    Adhesive tape-silicones Hives    Neomycin-bacitracin-polymyxin      Other reaction(s): makes area worse    Oxycodone      Other reaction(s):  "hoarseness  restless, rash    Dexamethasone Rash     pain in jaw, shoulder, ears, and rash        Patient Care Team:  Sloan Rai MD as PCP - General (Internal Medicine)  Laura Escalante MD as Consulting Physician (Cardiology)  Juliano Yeboah MD as Consulting Physician (Urology)     Subjective:     Review of Systems    12 point review of systems conducted, negative except as stated in the history of present illness. See HPI for details.    Objective:     Visit Vitals  BP (P) 122/84 (BP Location: Left arm, Patient Position: Sitting)   Pulse 73   Temp 96.9 °F (36.1 °C) (Temporal)   Resp 16   Ht 5' 7" (1.702 m)   Wt 44.4 kg (97 lb 12.8 oz)   SpO2 97%   BMI 15.32 kg/m²       Physical Exam  Constitutional:       Appearance: Normal appearance.   HENT:      Head: Normocephalic and atraumatic.   Eyes:      Extraocular Movements: Extraocular movements intact.      Pupils: Pupils are equal, round, and reactive to light.   Cardiovascular:      Rate and Rhythm: Normal rate and regular rhythm.      Heart sounds: Murmur heard.   Pulmonary:      Effort: Pulmonary effort is normal.      Breath sounds: Normal breath sounds.   Skin:     General: Skin is warm and dry.   Neurological:      General: No focal deficit present.      Mental Status: She is alert.   Psychiatric:         Mood and Affect: Mood normal.         Labs Reviewed:     Chemistry:  Lab Results   Component Value Date     04/23/2025    K 4.5 04/23/2025    BUN 31.3 (H) 04/23/2025    CREATININE 0.94 04/23/2025    EGFRNORACEVR 58 04/23/2025    CALCIUM 9.7 04/23/2025    ALKPHOS 74 04/23/2025    ALBUMIN 3.8 04/23/2025    BILIDIR 0.10 03/21/2019    IBILI 0.30 03/21/2019    AST 21 04/23/2025    ALT 12 04/23/2025    [id number] 51.3 09/25/2023    TSH 2.5267 09/23/2022        Lab Results   Component Value Date    HGBA1C 5.6 01/18/2024        Hematology:  Lab Results   Component Value Date    WBC 5.04 09/04/2024    HGB 12.7 09/04/2024    HCT 38.6 09/04/2024 "     09/04/2024       Lipid Panel:  Lab Results   Component Value Date    CHOL 223 (H) 04/23/2025    HDL 78 (H) 04/23/2025    .00 04/23/2025    TRIG 81 04/23/2025    TOTALCHOLEST 3 04/23/2025        Urine:  Lab Results   Component Value Date    APPEARANCEUA Clear 09/25/2023    SGUA 1.010 09/25/2023    PROTEINUA Negative 09/25/2023    KETONESUA Negative 09/25/2023    LEUKOCYTESUR Trace (A) 09/25/2023    RBCUA 0-5 09/25/2023    WBCUA <5 09/25/2023    BACTERIA None Seen 09/25/2023        Assessment:       ICD-10-CM ICD-9-CM   1. Primary hypertension  I10 401.9   2. Constipation, unspecified constipation type  K59.00 564.00   3. Impaired fasting blood sugar  R73.01 790.21   4. Aortic atherosclerosis  I70.0 440.0   5. History of renal cell cancer  Z85.528 V10.52   6. Temporal arteritis syndrome  M31.6 446.5   7. History of breast cancer  Z85.3 V10.3   8. Weight loss  R63.4 783.21   9. Vitamin D deficiency, unspecified  E55.9 268.9        Plan:     1. Primary hypertension  Assessment & Plan:  Well controlled.   Hypertension Medications              metoprolol succinate (TOPROL-XL) 25 MG 24 hr tablet Take 12.5 mg by mouth once daily.     Low Sodium Diet (DASH Diet - Less than 2 grams of sodium per day).  Monitor blood pressure daily and log. Report consistent numbers greater than 140/90.  Maintain healthy weight with goal BMI <30. Exercise 30 minutes per day, 5 days per week.    Orders:  -     Comprehensive Metabolic Panel; Future; Expected date: 10/30/2025  -     TSH; Future; Expected date: 10/30/2025    2. Constipation, unspecified constipation type  Assessment & Plan:  Continue Amitiza + colace BID +  increase fiber/water intake       3. Impaired fasting blood sugar  -     Comprehensive Metabolic Panel; Future; Expected date: 10/30/2025  -     Lipid Panel; Future; Expected date: 10/30/2025  -     TSH; Future; Expected date: 10/30/2025  -     Hemoglobin A1C; Future; Expected date: 10/30/2025  -      Urinalysis; Future; Expected date: 10/30/2025    4. Aortic atherosclerosis  -     Lipid Panel; Future; Expected date: 10/30/2025    5. History of renal cell cancer  Overview:  S/p partial nephrectomy   Followed by Dr. Yeboah annually    Orders:  -     Vitamin D; Future; Expected date: 10/30/2025    6. Temporal arteritis syndrome  -     CBC Auto Differential; Future; Expected date: 10/30/2025  -     Comprehensive Metabolic Panel; Future; Expected date: 10/30/2025    7. History of breast cancer  Overview:  Diagnosed in 1999  S/p right mastectomy + chemo    Orders:  -     CBC Auto Differential; Future; Expected date: 10/30/2025    8. Weight loss  -     CBC Auto Differential; Future; Expected date: 10/30/2025  -     TSH; Future; Expected date: 10/30/2025    9. Vitamin D deficiency, unspecified  -     Vitamin D; Future; Expected date: 10/30/2025         Follow up in about 6 months (around 10/30/2025) for Medicare Wellness. In addition to their scheduled follow up, the patient has also been instructed to follow up on as needed basis.     Future Appointments   Date Time Provider Department Center   10/27/2025  9:00 AM Sloan Rai MD [facility] [address] [id number]        KENNETH Cha

## 2025-05-19 ENCOUNTER — TELEPHONE (OUTPATIENT)
Dept: INTERNAL MEDICINE | Facility: CLINIC | Age: OVER 89
End: 2025-05-19
Payer: MEDICARE

## 2025-05-19 DIAGNOSIS — K21.9 GASTROESOPHAGEAL REFLUX DISEASE, UNSPECIFIED WHETHER ESOPHAGITIS PRESENT: Primary | ICD-10-CM

## 2025-05-19 RX ORDER — PANTOPRAZOLE SODIUM 40 MG/1
40 TABLET, DELAYED RELEASE ORAL EVERY MORNING
Qty: 90 TABLET | Refills: 3 | Status: SHIPPED | OUTPATIENT
Start: 2025-05-19

## 2025-05-19 NOTE — TELEPHONE ENCOUNTER
----- Message from Celena sent at 5/19/2025  4:06 PM CDT -----  [facility] - [place], [place] - [address] [place] [address] [[id number]]    REFILL REQUEST    pantoprazole (PROTONIX) 40 MG tablet     RX sent to pharmacy on file.

## 2025-08-15 ENCOUNTER — HOSPITAL ENCOUNTER (EMERGENCY)
Facility: HOSPITAL | Age: OVER 89
Discharge: HOME | End: 2025-08-15
Attending: STUDENT IN AN ORGANIZED HEALTH CARE EDUCATION/TRAINING PROGRAM
Payer: MEDICARE

## 2025-08-15 VITALS
OXYGEN SATURATION: 98 % | BODY MASS INDEX: 15.22 KG/M2 | TEMPERATURE: 98 F | HEART RATE: 84 BPM | WEIGHT: 97 LBS | DIASTOLIC BLOOD PRESSURE: 85 MMHG | RESPIRATION RATE: 19 BRPM | HEIGHT: 67 IN | SYSTOLIC BLOOD PRESSURE: 168 MMHG

## 2025-08-15 DIAGNOSIS — K59.00 CONSTIPATION, UNSPECIFIED CONSTIPATION TYPE: Primary | ICD-10-CM

## 2025-08-15 PROCEDURE — 99282 EMERGENCY DEPT VISIT SF MDM: CPT

## 2025-08-15 PROCEDURE — 25000003 PHARM REV CODE 250: Performed by: STUDENT IN AN ORGANIZED HEALTH CARE EDUCATION/TRAINING PROGRAM

## 2025-08-15 RX ORDER — SODIUM CHLORIDE 0.9 G/100ML
500 IRRIGANT IRRIGATION
Status: COMPLETED | OUTPATIENT
Start: 2025-08-15 | End: 2025-08-15

## 2025-08-15 RX ORDER — SYRING-NEEDL,DISP,INSUL,0.3 ML 29 G X1/2"
296 SYRINGE, EMPTY DISPOSABLE MISCELLANEOUS
Status: COMPLETED | OUTPATIENT
Start: 2025-08-15 | End: 2025-08-15

## 2025-08-15 RX ORDER — PSEUDOEPHEDRINE/ACETAMINOPHEN 30MG-500MG
100 TABLET ORAL
Status: COMPLETED | OUTPATIENT
Start: 2025-08-15 | End: 2025-08-15

## 2025-08-15 RX ADMIN — SODIUM CHLORIDE 500 ML: 900 IRRIGANT IRRIGATION at 10:08

## 2025-08-15 RX ADMIN — Medication 100 ML: at 10:08

## 2025-08-15 RX ADMIN — MAGNESIUM CITRATE 296 ML: 1.75 LIQUID ORAL at 09:08

## 2025-08-28 DIAGNOSIS — C64.9 MALIGNANT NEOPLASM OF KIDNEY, UNSPECIFIED LATERALITY: ICD-10-CM

## 2025-08-28 DIAGNOSIS — K59.00 CONSTIPATION, UNSPECIFIED CONSTIPATION TYPE: ICD-10-CM

## 2025-08-28 RX ORDER — AMITRIPTYLINE HYDROCHLORIDE 10 MG/1
10 TABLET, FILM COATED ORAL NIGHTLY
Qty: 90 TABLET | Refills: 3 | Status: SHIPPED | OUTPATIENT
Start: 2025-08-28

## 2025-08-28 RX ORDER — LUBIPROSTONE 0.01 MG/1
8 CAPSULE ORAL 2 TIMES DAILY
Qty: 180 CAPSULE | Refills: 3 | Status: SHIPPED | OUTPATIENT
Start: 2025-08-28